# Patient Record
Sex: FEMALE | Race: BLACK OR AFRICAN AMERICAN | NOT HISPANIC OR LATINO | Employment: FULL TIME | URBAN - METROPOLITAN AREA
[De-identification: names, ages, dates, MRNs, and addresses within clinical notes are randomized per-mention and may not be internally consistent; named-entity substitution may affect disease eponyms.]

---

## 2018-08-30 ENCOUNTER — OFFICE VISIT (OUTPATIENT)
Dept: GASTROENTEROLOGY | Facility: CLINIC | Age: 38
End: 2018-08-30

## 2018-08-30 VITALS
HEIGHT: 68 IN | DIASTOLIC BLOOD PRESSURE: 80 MMHG | SYSTOLIC BLOOD PRESSURE: 126 MMHG | BODY MASS INDEX: 31.37 KG/M2 | WEIGHT: 207 LBS | TEMPERATURE: 98.8 F

## 2018-08-30 DIAGNOSIS — R19.5 LOOSE STOOLS: ICD-10-CM

## 2018-08-30 DIAGNOSIS — R10.13 DYSPEPSIA: ICD-10-CM

## 2018-08-30 DIAGNOSIS — R10.11 RIGHT UPPER QUADRANT ABDOMINAL PAIN: Primary | ICD-10-CM

## 2018-08-30 DIAGNOSIS — K44.9 HIATAL HERNIA: ICD-10-CM

## 2018-08-30 PROCEDURE — 99204 OFFICE O/P NEW MOD 45 MIN: CPT | Performed by: INTERNAL MEDICINE

## 2018-08-30 RX ORDER — PANTOPRAZOLE SODIUM 40 MG/1
40 TABLET, DELAYED RELEASE ORAL 2 TIMES DAILY
COMMUNITY
End: 2021-11-19 | Stop reason: SDUPTHER

## 2018-08-30 RX ORDER — DICYCLOMINE HYDROCHLORIDE 10 MG/1
10 CAPSULE ORAL
COMMUNITY
End: 2018-08-30 | Stop reason: ALTCHOICE

## 2018-08-30 RX ORDER — HYOSCYAMINE SULFATE 0.125 MG
0.12 TABLET ORAL EVERY 6 HOURS PRN
Qty: 90 TABLET | Refills: 1 | Status: SHIPPED | OUTPATIENT
Start: 2018-08-30 | End: 2021-11-19

## 2018-08-30 RX ORDER — LISINOPRIL AND HYDROCHLOROTHIAZIDE 20; 12.5 MG/1; MG/1
TABLET ORAL
COMMUNITY
End: 2021-11-19

## 2018-08-30 RX ORDER — METHOCARBAMOL 500 MG/1
500 TABLET, FILM COATED ORAL AS NEEDED
COMMUNITY

## 2018-08-30 RX ORDER — HYDROCODONE BITARTRATE AND ACETAMINOPHEN 7.5; 325 MG/1; MG/1
TABLET ORAL AS NEEDED
Refills: 0 | COMMUNITY
Start: 2018-08-05

## 2018-08-30 NOTE — PROGRESS NOTES
Chief Complaint   Patient presents with   • Heartburn   • Hiatal Hernia   • Abdominal Pain     tenderness and cramping   • Diarrhea     in the AM, mucous in stool       Subjective     HPI    Keerthi Graff is a 38 y.o. female with a past medical history noted below who presents for evaluation of abdominal pain and diarrhea.  Symptoms have been an issue for a number of years with acute worsening over the past year.  She describes RUQ pain. It is intermittent.  Described as tenderness and pressure like.  Associated with nausea but no vomiting.  She reports that symptoms have been attributed to a hiatal hernia  She reports EGD with Dr Joiner in about 2013.    Describes loose stools in the morning.  She averages about 2 loose BMs in the morning after drinking coffee.  She has had mucus in the stool, no blood. She thinks she averages about 2-4 BMs per day.  However, sometimes she has none. She does take bentyl 3-4 days per week.  She takes pantoprazole daily.      She takes daily ibuprofen for bulging disks. She finds pain meds cause too much constipation.     Labs from 2017 at [facility] reviewed--she had a normal BMP      Past Medical History:   Diagnosis Date   • Delayed gastric emptying    • GERD (gastroesophageal reflux disease)    • Hiatal hernia          Current Outpatient Prescriptions:   •  diclofenac (VOLTAREN) 1 % gel gel, Place 2 g on the skin as directed by provider., Disp: , Rfl:   •  HYDROcodone-acetaminophen (NORCO) 7.5-325 MG per tablet, Take  by mouth As Needed., Disp: , Rfl: 0  •  lisinopril-hydrochlorothiazide (PRINZIDE,ZESTORETIC) 20-12.5 MG per tablet, lisinopril 20 mg-hydrochlorothiazide 12.5 mg tablet  Take 1 tablet every day by oral route., Disp: , Rfl:   •  methocarbamol (ROBAXIN) 500 MG tablet, Take 500 mg by mouth As Needed., Disp: , Rfl:   •  metoprolol tartrate (LOPRESSOR) 25 MG tablet, Take 25 mg by mouth Daily., Disp: , Rfl:   •  Nitrofurantoin Macrocrystal (MACRODANTIN PO), Take   by mouth Daily., Disp: , Rfl:   •  pantoprazole (PROTONIX) 40 MG EC tablet, Take 40 mg by mouth 2 (Two) Times a Day., Disp: , Rfl:   •  hyoscyamine (ANASPAZ,LEVSIN) 0.125 MG tablet, Take 1 tablet by mouth Every 6 (Six) Hours As Needed for Cramping (abdominal pain)., Disp: 90 tablet, Rfl: 1    Allergies   Allergen Reactions   • Sulfamethoxazole-Trimethoprim Hives       Social History     Social History   • Marital status:      Spouse name: N/A   • Number of children: N/A   • Years of education: N/A     Occupational History   • Not on file.     Social History Main Topics   • Smoking status: Former Smoker   • Smokeless tobacco: Not on file   • Alcohol use Not on file   • Drug use: Unknown   • Sexual activity: Not on file     Other Topics Concern   • Not on file     Social History Narrative   • No narrative on file       Family History   Problem Relation Age of Onset   • Cirrhosis Mother        Review of Systems   Constitutional: Negative for activity change, appetite change and fatigue.   HENT: Negative for sore throat and trouble swallowing.    Respiratory: Negative.    Cardiovascular: Negative.    Gastrointestinal: Positive for abdominal pain and diarrhea. Negative for abdominal distention and blood in stool.        +GERD   Endocrine: Negative for cold intolerance and heat intolerance.   Genitourinary: Negative for difficulty urinating, dysuria and frequency.   Musculoskeletal: Negative for arthralgias, back pain and myalgias.   Skin: Negative.    Hematological: Negative for adenopathy. Does not bruise/bleed easily.   All other systems reviewed and are negative.      Objective     Vitals:    08/30/18 1001   BP: 126/80   Temp: 98.8 °F (37.1 °C)     1    08/30/18  1001   Weight: 93.9 kg (207 lb)     Body mass index is 31.47 kg/m².    Physical Exam   Constitutional: She is oriented to person, place, and time. She appears well-developed and well-nourished. No distress.   HENT:   Head: Normocephalic and  atraumatic.   Right Ear: External ear normal.   Left Ear: External ear normal.   Nose: Nose normal.   Mouth/Throat: Oropharynx is clear and moist.   Eyes: Conjunctivae and EOM are normal. Right eye exhibits no discharge. Left eye exhibits no discharge. No scleral icterus.   Neck: Normal range of motion. Neck supple. No thyromegaly present.   No supraclavicular adenopathy   Cardiovascular: Normal rate, regular rhythm, normal heart sounds and intact distal pulses.  Exam reveals no gallop.    No murmur heard.  No lower extremity edema   Pulmonary/Chest: Effort normal and breath sounds normal. No respiratory distress. She has no wheezes.   Abdominal: Soft. Normal appearance and bowel sounds are normal. She exhibits no distension and no mass. There is no hepatosplenomegaly. There is tenderness. There is no rigidity, no rebound and no guarding. No hernia.   RUQ TTP   Genitourinary:   Genitourinary Comments: Rectal exam deferred   Musculoskeletal: Normal range of motion. She exhibits no edema or tenderness.   No atrophy of upper or lower extremities.  Normal digits and nails of both hands.   Lymphadenopathy:     She has no cervical adenopathy.   Neurological: She is alert and oriented to person, place, and time. She displays no atrophy. Coordination normal.   Skin: Skin is warm and dry. No rash noted. She is not diaphoretic. No erythema.   Psychiatric: She has a normal mood and affect. Her behavior is normal. Judgment and thought content normal.   Vitals reviewed.      WBC   Date Value Ref Range Status   02/10/2015 4.27 (L) 4.50 - 10.70 K/Cumm Final     RBC   Date Value Ref Range Status   02/10/2015 5.01 3.90 - 5.20 Million Final     Hemoglobin   Date Value Ref Range Status   02/10/2015 11.1 (L) 11.9 - 15.5 g/dL Final     Hematocrit   Date Value Ref Range Status   02/10/2015 36.2 35.6 - 45.5 % Final     MCV   Date Value Ref Range Status   02/10/2015 72.3 (L) 80.5 - 98.2 fL Final     MCH   Date Value Ref Range Status    02/10/2015 22.2 (L) 26.9 - 32.0 pg Final     MCHC   Date Value Ref Range Status   02/10/2015 30.7 (L) 32.4 - 36.3 g/dL Final     RDW   Date Value Ref Range Status   02/10/2015 15.1 (H) 11.7 - 13.0 % Final     Platelets   Date Value Ref Range Status   02/10/2015 335 140 - 500 K/Cumm Final     Neutrophil Rel %   Date Value Ref Range Status   02/10/2015 68.8 42.7 - 76.0 % Final     Lymphocyte Rel %   Date Value Ref Range Status   02/10/2015 22.7 19.6 - 45.3 % Final     Monocyte Rel %   Date Value Ref Range Status   02/10/2015 7.5 5.0 - 12.0 % Final     Eosinophil Rel %   Date Value Ref Range Status   02/10/2015 0.5 0.3 - 6.2 % Final     Basophil Rel %   Date Value Ref Range Status   02/10/2015 0.5 0.0 - 1.5 % Final     Neutrophils Absolute   Date Value Ref Range Status   02/10/2015 2.9 1.9 - 8.1 K/Cumm Final     Lymphocytes Absolute   Date Value Ref Range Status   02/10/2015 1.0 0.9 - 4.8 K/Cumm Final     Monocytes Absolute   Date Value Ref Range Status   02/10/2015 0.3 0.2 - 1.2 K/Cumm Final     Eosinophils Absolute   Date Value Ref Range Status   02/10/2015 0.0 0.0 - 0.7 K/Cumm Final     Basophils Absolute   Date Value Ref Range Status   02/10/2015 0.0 0.0 - 0.2 K/Cumm Final       Glucose   Date Value Ref Range Status   02/10/2015 98 65 - 99 mg/dL Final     Sodium   Date Value Ref Range Status   02/10/2015 141 136 - 145 mmol/L Final     Potassium   Date Value Ref Range Status   02/10/2015 3.4 (L) 3.5 - 5.2 mmol/L Final     CO2   Date Value Ref Range Status   02/10/2015 33 (H) 22 - 29 mmol/L Final     Chloride   Date Value Ref Range Status   02/10/2015 102 98 - 107 mmol/L Final     Creatinine   Date Value Ref Range Status   02/10/2015 0.71 0.57 - 1.00 mg/dL Final     BUN   Date Value Ref Range Status   02/10/2015 10 6 - 20 mg/dL Final     Calcium   Date Value Ref Range Status   02/10/2015 9.4 8.6 - 10.5 mg/dL Final     Alkaline Phosphatase   Date Value Ref Range Status   02/10/2015 61 39 - 117 U/L Final     Total  Protein   Date Value Ref Range Status   02/10/2015 7.5 6.0 - 8.5 g/dL Final     ALT (SGPT)   Date Value Ref Range Status   02/10/2015 12 5 - 33 U/L Final     AST (SGOT)   Date Value Ref Range Status   02/10/2015 14 5 - 32 U/L Final     Total Bilirubin   Date Value Ref Range Status   02/10/2015 0.3 0.1 - 1.2 mg/dL Final     Albumin   Date Value Ref Range Status   02/10/2015 4.3 3.5 - 5.2 g/dL Final       RUQ US    IMPRESSION:  No acute abnormalities. Specifically, there is no sonographically-evident gallbladder or biliary pathology.        Dictated by: Cory Saavedra M.D.    Images and Report reviewed and interpreted by: Cory Saavedra M.D.    <PS><Electronically signed by: Cory Saavedra M.D.>  03/02/2016 1118    D: 03/02/2016 1117      No notes on file    Assessment/Plan    RUQ pain: chronic issue, no recent HIDA    Dyspepsia: suspect NSAID related    Diarrhea: mild, in the morning, hx of normal colonoscopy; suspect IBS    Plan  Check HIDA scan  Stop dicyclomine, start hyoscyamine  Get records from Dr. Yeboah to see if any further endoscopic workup is needed  continue pantoprazole as long as she is taking high-dose NSAIDs      Canice was seen today for heartburn, hiatal hernia, abdominal pain and diarrhea.    Diagnoses and all orders for this visit:    Right upper quadrant abdominal pain  -     NM Hepatobiliary Without CCK; Future    Loose stools    Hiatal hernia    Dyspepsia    Other orders  -     hyoscyamine (ANASPAZ,LEVSIN) 0.125 MG tablet; Take 1 tablet by mouth Every 6 (Six) Hours As Needed for Cramping (abdominal pain).        I have discussed the above plan with the patient.  They verbalize understanding and are in agreement with the plan.  They have been advised to contact the office for any questions, concerns, or changes related to their health.    Dictated utilizing Dragon dictation

## 2018-09-06 ENCOUNTER — TELEPHONE (OUTPATIENT)
Dept: GASTROENTEROLOGY | Facility: CLINIC | Age: 38
End: 2018-09-06

## 2018-09-06 DIAGNOSIS — R10.11 RIGHT UPPER QUADRANT ABDOMINAL PAIN: Primary | ICD-10-CM

## 2018-09-06 NOTE — TELEPHONE ENCOUNTER
Pt called and reports that she is severe pain in her right side.  She states her pain is a 6-7 on the pain scale. Pt denies a fever and chills.  She states her bowels are slightly loose.  Pt denies nausea.  She reports she went to the ER at [facility] but they only did lab work and send her home. Pt was to have her hida scan today but it was not approved.  Pt states she did take some motrin and one of her back pain pills(hydrocodone) and her pain has eased off some.  Advised pt would update DR Angela and in the meantime if pain worsens to go back to ER.  Also cautioned pt that the pain medication could be masking her pain and to be cautious.  Pt verb understanding.  ADvised pt that we will check with Suzi to see why the hida scan was not covered by her insurance.

## 2018-09-07 ENCOUNTER — APPOINTMENT (OUTPATIENT)
Dept: NUCLEAR MEDICINE | Facility: HOSPITAL | Age: 38
End: 2018-09-07
Attending: INTERNAL MEDICINE

## 2018-09-07 NOTE — TELEPHONE ENCOUNTER
Suzi is working on the HIDA.  Can we get her ER records-- if she didn't have a CT scan there we can order one.  Agree with to ER if not better.

## 2018-09-07 NOTE — TELEPHONE ENCOUNTER
Called [facility] at [phone] on vm requested the pt's ER records be faxed to [phone].

## 2018-09-07 NOTE — TELEPHONE ENCOUNTER
ER Records received and scanned under media tab.  No ct scan ordered while in ER.  Ct scan of abd/pelvis ordered.  Called pt and advised that we have ordered a ct scan and are waiting to hear back from her insurance regarding the hida scan. Pt verb understanding and update sent to Dr Angela.

## 2018-09-10 ENCOUNTER — TELEPHONE (OUTPATIENT)
Dept: GASTROENTEROLOGY | Facility: CLINIC | Age: 38
End: 2018-09-10

## 2018-09-10 DIAGNOSIS — R10.11 RUQ PAIN: Primary | ICD-10-CM

## 2018-09-10 NOTE — TELEPHONE ENCOUNTER
----- Message from Suzi Means sent at 9/10/2018  1:17 PM EDT -----  Regarding: CT scan  I was able to get pt CT scan approved but only with contrast. Is this ok? They still won't approve HIDA without the U/S. If this is ok, please put in a new order. Thanks

## 2018-09-12 ENCOUNTER — HOSPITAL ENCOUNTER (OUTPATIENT)
Dept: CT IMAGING | Facility: HOSPITAL | Age: 38
Discharge: HOME | End: 2018-09-12
Attending: INTERNAL MEDICINE | Admitting: INTERNAL MEDICINE

## 2018-09-12 ENCOUNTER — APPOINTMENT (OUTPATIENT)
Dept: CT IMAGING | Facility: HOSPITAL | Age: 38
End: 2018-09-12
Attending: INTERNAL MEDICINE

## 2018-09-12 DIAGNOSIS — R10.11 RUQ PAIN: ICD-10-CM

## 2018-09-12 LAB — CREAT BLDA-MCNC: 0.8 MG/DL (ref 0.6–1.3)

## 2018-09-12 PROCEDURE — 82565 ASSAY OF CREATININE: CPT

## 2018-09-12 PROCEDURE — 0 DIATRIZOATE MEGLUMINE & SODIUM PER 1 ML: Performed by: INTERNAL MEDICINE

## 2018-09-12 PROCEDURE — 25010000002 IOPAMIDOL 61 % SOLUTION: Performed by: INTERNAL MEDICINE

## 2018-09-12 PROCEDURE — 74177 CT ABD & PELVIS W/CONTRAST: CPT

## 2018-09-12 RX ADMIN — IOPAMIDOL 85 ML: 612 INJECTION, SOLUTION INTRAVENOUS at 09:48

## 2018-09-12 RX ADMIN — DIATRIZOATE MEGLUMINE AND DIATRIZOATE SODIUM 30 ML: 660; 100 LIQUID ORAL; RECTAL at 08:30

## 2018-09-14 ENCOUNTER — TELEPHONE (OUTPATIENT)
Dept: GASTROENTEROLOGY | Facility: CLINIC | Age: 38
End: 2018-09-14

## 2018-09-14 NOTE — TELEPHONE ENCOUNTER
PT CALLED AGAIN REGARDING CT SCAN RESULTS   Received: Today      Call patient   Message Contents   Tessa Wilson [address]   Phone Number: [phone]             SHE IS A PT OF DR FONG. PT WAS HOPING TO GET RESULTS TODAY     Message to DR Fong.

## 2018-09-14 NOTE — TELEPHONE ENCOUNTER
----- Message from Tessa Wilson sent at 9/14/2018  4:11 PM EDT -----  Regarding: PT CALLED AGAIN REGARDING CT SCAN RESULTS  Contact: [phone]  SHE IS A PT OF DR FONG. PT WAS HOPING TO GET RESULTS TODAY.

## 2018-09-14 NOTE — TELEPHONE ENCOUNTER
----- Message from Suzi Means sent at 9/14/2018  1:48 PM EDT -----  Regarding: results  Contact: [phone]  Pt would like results from CT scan. Rivera

## 2018-09-14 NOTE — TELEPHONE ENCOUNTER
I called and discussed her results ET with her-Randy normal except for a left ovarian cyst.    She says she's been taking hyoscyamine which has helped but she is still having the episodes of right upper quadrant pain.    Suzi, can you look to see if we are okay to proceed with a HIDA scan now that she has had a CT scan, thanks

## 2018-09-17 ENCOUNTER — TELEPHONE (OUTPATIENT)
Dept: GASTROENTEROLOGY | Facility: CLINIC | Age: 38
End: 2018-09-17

## 2018-09-17 NOTE — TELEPHONE ENCOUNTER
----- Message from Suzi Means sent at 9/17/2018  1:20 PM EDT -----  Regarding: peer to peer  Contact: [phone]  I called [facility] to get the case withdrawn so I can try to get it approved. They will not allow me to withdraw it and said that if we want an approval for this scan we need to have the dr call to do a peer to peer only.    Case#- [id number]  Phone number- [phone] option #3    Thanks

## 2019-04-04 PROCEDURE — 99284 EMERGENCY DEPT VISIT MOD MDM: CPT

## 2019-04-05 ENCOUNTER — HOSPITAL ENCOUNTER (EMERGENCY)
Facility: HOSPITAL | Age: 39
Discharge: HOME | End: 2019-04-05
Attending: EMERGENCY MEDICINE | Admitting: EMERGENCY MEDICINE

## 2019-04-05 ENCOUNTER — APPOINTMENT (OUTPATIENT)
Dept: CT IMAGING | Facility: HOSPITAL | Age: 39
End: 2019-04-05

## 2019-04-05 ENCOUNTER — TELEPHONE (OUTPATIENT)
Dept: GASTROENTEROLOGY | Facility: CLINIC | Age: 39
End: 2019-04-05

## 2019-04-05 VITALS
HEART RATE: 79 BPM | WEIGHT: 223 LBS | OXYGEN SATURATION: 100 % | RESPIRATION RATE: 16 BRPM | HEIGHT: 68 IN | DIASTOLIC BLOOD PRESSURE: 61 MMHG | SYSTOLIC BLOOD PRESSURE: 106 MMHG | TEMPERATURE: 98.8 F | BODY MASS INDEX: 33.8 KG/M2

## 2019-04-05 DIAGNOSIS — R10.84 GENERALIZED ABDOMINAL PAIN: Primary | ICD-10-CM

## 2019-04-05 DIAGNOSIS — R30.0 DYSURIA: ICD-10-CM

## 2019-04-05 LAB
ALBUMIN SERPL-MCNC: 3.7 G/DL (ref 3.5–5.2)
ALBUMIN/GLOB SERPL: 1.3 G/DL
ALP SERPL-CCNC: 47 U/L (ref 39–117)
ALT SERPL W P-5'-P-CCNC: 13 U/L (ref 1–33)
ANION GAP SERPL CALCULATED.3IONS-SCNC: 9.9 MMOL/L
AST SERPL-CCNC: 12 U/L (ref 1–32)
BASOPHILS # BLD AUTO: 0.03 10*3/MM3 (ref 0–0.2)
BASOPHILS NFR BLD AUTO: 0.6 % (ref 0–1.5)
BILIRUB SERPL-MCNC: <0.2 MG/DL (ref 0.2–1.2)
BILIRUB UR QL STRIP: NEGATIVE
BUN BLD-MCNC: 15 MG/DL (ref 6–20)
BUN/CREAT SERPL: 16.7 (ref 7–25)
CALCIUM SPEC-SCNC: 9.4 MG/DL (ref 8.6–10.5)
CHLORIDE SERPL-SCNC: 100 MMOL/L (ref 98–107)
CLARITY UR: CLEAR
CO2 SERPL-SCNC: 28.1 MMOL/L (ref 22–29)
COLOR UR: YELLOW
CREAT BLD-MCNC: 0.9 MG/DL (ref 0.57–1)
DEPRECATED RDW RBC AUTO: 41.8 FL (ref 37–54)
EOSINOPHIL # BLD AUTO: 0.11 10*3/MM3 (ref 0–0.4)
EOSINOPHIL NFR BLD AUTO: 2.3 % (ref 0.3–6.2)
ERYTHROCYTE [DISTWIDTH] IN BLOOD BY AUTOMATED COUNT: 15.2 % (ref 12.3–15.4)
GFR SERPL CREATININE-BSD FRML MDRD: 84 ML/MIN/1.73
GLOBULIN UR ELPH-MCNC: 2.9 GM/DL
GLUCOSE BLD-MCNC: 92 MG/DL (ref 65–99)
GLUCOSE UR STRIP-MCNC: NEGATIVE MG/DL
HCT VFR BLD AUTO: 33.6 % (ref 34–46.6)
HGB BLD-MCNC: 10 G/DL (ref 12–15.9)
HGB UR QL STRIP.AUTO: NEGATIVE
HOLD SPECIMEN: NORMAL
HOLD SPECIMEN: NORMAL
IMM GRANULOCYTES # BLD AUTO: 0.01 10*3/MM3 (ref 0–0.05)
IMM GRANULOCYTES NFR BLD AUTO: 0.2 % (ref 0–0.5)
KETONES UR QL STRIP: ABNORMAL
LEUKOCYTE ESTERASE UR QL STRIP.AUTO: NEGATIVE
LIPASE SERPL-CCNC: 18 U/L (ref 13–60)
LYMPHOCYTES # BLD AUTO: 1.98 10*3/MM3 (ref 0.7–3.1)
LYMPHOCYTES NFR BLD AUTO: 41.2 % (ref 19.6–45.3)
MCH RBC QN AUTO: 22.6 PG (ref 26.6–33)
MCHC RBC AUTO-ENTMCNC: 29.8 G/DL (ref 31.5–35.7)
MCV RBC AUTO: 75.8 FL (ref 79–97)
MONOCYTES # BLD AUTO: 0.41 10*3/MM3 (ref 0.1–0.9)
MONOCYTES NFR BLD AUTO: 8.5 % (ref 5–12)
NEUTROPHILS # BLD AUTO: 2.27 10*3/MM3 (ref 1.4–7)
NEUTROPHILS NFR BLD AUTO: 47.2 % (ref 42.7–76)
NITRITE UR QL STRIP: NEGATIVE
NRBC BLD AUTO-RTO: 0 /100 WBC (ref 0–0)
PH UR STRIP.AUTO: <=5 [PH] (ref 5–8)
PLATELET # BLD AUTO: 296 10*3/MM3 (ref 140–450)
PMV BLD AUTO: 10.9 FL (ref 6–12)
POTASSIUM BLD-SCNC: 3.3 MMOL/L (ref 3.5–5.2)
PROT SERPL-MCNC: 6.6 G/DL (ref 6–8.5)
PROT UR QL STRIP: NEGATIVE
RBC # BLD AUTO: 4.43 10*6/MM3 (ref 3.77–5.28)
SODIUM BLD-SCNC: 138 MMOL/L (ref 136–145)
SP GR UR STRIP: >=1.03 (ref 1–1.03)
UROBILINOGEN UR QL STRIP: ABNORMAL
WBC NRBC COR # BLD: 4.81 10*3/MM3 (ref 3.4–10.8)
WHOLE BLOOD HOLD SPECIMEN: NORMAL
WHOLE BLOOD HOLD SPECIMEN: NORMAL

## 2019-04-05 PROCEDURE — 96375 TX/PRO/DX INJ NEW DRUG ADDON: CPT

## 2019-04-05 PROCEDURE — 96374 THER/PROPH/DIAG INJ IV PUSH: CPT

## 2019-04-05 PROCEDURE — 25010000002 ONDANSETRON PER 1 MG: Performed by: PHYSICIAN ASSISTANT

## 2019-04-05 PROCEDURE — 81003 URINALYSIS AUTO W/O SCOPE: CPT | Performed by: PHYSICIAN ASSISTANT

## 2019-04-05 PROCEDURE — 74176 CT ABD & PELVIS W/O CONTRAST: CPT

## 2019-04-05 PROCEDURE — 80053 COMPREHEN METABOLIC PANEL: CPT | Performed by: PHYSICIAN ASSISTANT

## 2019-04-05 PROCEDURE — 85025 COMPLETE CBC W/AUTO DIFF WBC: CPT | Performed by: PHYSICIAN ASSISTANT

## 2019-04-05 PROCEDURE — 25010000002 HYDROMORPHONE 1 MG/ML SOLUTION: Performed by: EMERGENCY MEDICINE

## 2019-04-05 PROCEDURE — 83690 ASSAY OF LIPASE: CPT | Performed by: PHYSICIAN ASSISTANT

## 2019-04-05 RX ORDER — ONDANSETRON 2 MG/ML
4 INJECTION INTRAMUSCULAR; INTRAVENOUS ONCE
Status: DISCONTINUED | OUTPATIENT
Start: 2019-04-05 | End: 2019-04-05

## 2019-04-05 RX ORDER — ONDANSETRON 2 MG/ML
4 INJECTION INTRAMUSCULAR; INTRAVENOUS ONCE
Status: COMPLETED | OUTPATIENT
Start: 2019-04-05 | End: 2019-04-05

## 2019-04-05 RX ORDER — FLAVOXATE HYDROCHLORIDE 100 MG/1
100 TABLET ORAL 3 TIMES DAILY PRN
Qty: 12 TABLET | Refills: 0 | Status: SHIPPED | OUTPATIENT
Start: 2019-04-05 | End: 2021-11-19

## 2019-04-05 RX ORDER — TRAMADOL HYDROCHLORIDE 50 MG/1
50 TABLET ORAL EVERY 6 HOURS PRN
Qty: 15 TABLET | Refills: 0 | Status: SHIPPED | OUTPATIENT
Start: 2019-04-05 | End: 2020-07-29 | Stop reason: SDDI

## 2019-04-05 RX ADMIN — SODIUM CHLORIDE 500 ML: 9 INJECTION, SOLUTION INTRAVENOUS at 03:03

## 2019-04-05 RX ADMIN — ONDANSETRON HYDROCHLORIDE 4 MG: 2 SOLUTION INTRAMUSCULAR; INTRAVENOUS at 03:06

## 2019-04-05 RX ADMIN — HYDROMORPHONE HYDROCHLORIDE 1 MG: 1 INJECTION, SOLUTION INTRAMUSCULAR; INTRAVENOUS; SUBCUTANEOUS at 03:08

## 2019-04-05 NOTE — TELEPHONE ENCOUNTER
Call to pt.  States understood that f/u 8/2018 would include CT and HIDA.  Insurance denied HIDA - pt thought this was in appeal.      However, stopped taking Ibuprofen and stomach issues resolved.  Went to ER last night 2nd resumption of abd pain (see notes).  Was advised to f/u with GI and to have HIDA.       Asking if may proceed with HIDA, or if needs o/v first.     Message to Dr Angela.

## 2019-04-05 NOTE — TELEPHONE ENCOUNTER
----- Message from Kerwin Bray sent at 4/5/2019 10:07 AM EDT -----  Regarding: pt called about a hida scan ?  Contact: [phone]  ..

## 2019-04-05 NOTE — ED PROVIDER NOTES
" EMERGENCY DEPARTMENT ENCOUNTER    CHIEF COMPLAINT  Chief Complaint: abd pain  History given by: patient   History limited by: nothing  Room Number: 28/28  PMD: Kojo Hughes PA      HPI:  Pt is a 38 y.o. female who presents to the ED complaining of intermittent abd pain that has been going on for a few days. Pt reports the pain radiates to her back. Pt reports \"I just don't feel right, I'm sick to my stomach\". Pt admits nausea, headache, constipation, and dysuria. Pt reports she takes Ibuprofen for pain relief but she stopped taking it and her stomach felt better. Pt [place] [facility] because she assumed she had a UTI. Pt states she recently finished a Z-pack. Pt reports a hx of UTI's. Pt had a hysterectomy in the past. There are no other complaints at this time.      Duration: days  Onset: gradual  Timing: intermittent   Location: abd   Radiation: back  Quality: \"I just don't feel right, I'm sick to my stomach\"  Intensity/Severity: moderate  Progression: unchanged   Associated Symptoms: pt admits nausea, headache, constipation, and dysuria  Aggravating Factors: none mentioned   Alleviating Factors: pt reports she takes Ibuprofen for pain relief but she stopped taking it and her stomach felt better  Previous Episodes: pt reports a hx of UTI's  Treatment before arrival: pt reports she [place] [facility] [place] because she assumed she had a UTI    PAST MEDICAL HISTORY  Active Ambulatory Problems     Diagnosis Date Noted   • Right upper quadrant abdominal pain 08/30/2018   • Loose stools 08/30/2018   • Hiatal hernia 08/30/2018   • Dyspepsia 08/30/2018     Resolved Ambulatory Problems     Diagnosis Date Noted   • No Resolved Ambulatory Problems     Past Medical History:   Diagnosis Date   • Delayed gastric emptying    • GERD (gastroesophageal reflux disease)    • Hiatal hernia        PAST SURGICAL HISTORY  Past Surgical History:   Procedure Laterality Date   • BILATERAL " "BREAST REDUCTION     •  SECTION      x2   • COLONOSCOPY  2013    normal   • CYST REMOVAL Right     x2   • DILATATION AND CURETTAGE     • ENDOMETRIAL ABLATION     • GANGLION CYST EXCISION Right    • HYSTERECTOMY     • UPPER GASTROINTESTINAL ENDOSCOPY  2013    gerd       FAMILY HISTORY  Family History   Problem Relation Age of Onset   • Cirrhosis Mother        SOCIAL HISTORY  Social History     Socioeconomic History   • Marital status:      Spouse name: Not on file   • Number of children: Not on file   • Years of education: Not on file   • Highest education level: Not on file   Tobacco Use   • Smoking status: Former Smoker   • Smokeless tobacco: Never Used   Substance and Sexual Activity   • Alcohol use: Yes     Comment: \"socially\" \"once a month\"   • Drug use: Yes     Frequency: 4.0 times per week     Types: Marijuana   • Sexual activity: Defer       ALLERGIES  Ibuprofen and Sulfamethoxazole-trimethoprim    REVIEW OF SYSTEMS  Review of Systems   Constitutional: Negative for fever.   HENT: Negative for sore throat.    Eyes: Negative.    Respiratory: Negative for cough and shortness of breath.    Cardiovascular: Negative for chest pain.   Gastrointestinal: Positive for abdominal pain (intermittent), constipation and nausea. Negative for diarrhea and vomiting.   Genitourinary: Positive for dysuria.   Musculoskeletal: Negative for neck pain.   Skin: Negative for rash.   Neurological: Positive for headaches. Negative for weakness and numbness.   Hematological: Negative.    Psychiatric/Behavioral: Negative.    All other systems reviewed and are negative.      PHYSICAL EXAM  ED Triage Vitals [19 2351]   Temp Heart Rate Resp BP SpO2   98.8 °F (37.1 °C) 103 16 -- 99 %      Temp src Heart Rate Source Patient Position BP Location FiO2 (%)   -- -- -- -- --       Physical Exam   Constitutional: She is oriented to person, place, and time. No distress.   HENT:   Head: Normocephalic and atraumatic.   Eyes: EOM " are normal. Pupils are equal, round, and reactive to light.   Neck: Normal range of motion. Neck supple.   Cardiovascular: Normal rate, regular rhythm and normal heart sounds.   Pulmonary/Chest: Effort normal and breath sounds normal. No respiratory distress.   Abdominal: Soft. Bowel sounds are normal. There is tenderness (R side). There is no rebound, no guarding and no CVA tenderness.   Musculoskeletal: Normal range of motion. She exhibits no edema.   Neurological: She is alert and oriented to person, place, and time. She has normal sensation and normal strength.   Skin: Skin is warm and dry. No rash noted.   Psychiatric: Mood and affect normal.   Nursing note and vitals reviewed.      LAB RESULTS  Lab Results (last 24 hours)     Procedure Component Value Units Date/Time    Urinalysis With Microscopic If Indicated (No Culture) - Urine, Clean Catch [[id number]]  (Abnormal) Collected:  04/05/19 0151    Specimen:  Urine, Clean Catch Updated:  04/05/19 0205     Color, UA Yellow     Appearance, UA Clear     pH, UA <=5.0     Specific Gravity, UA >=1.030     Glucose, UA Negative     Ketones, UA Trace     Bilirubin, UA Negative     Blood, UA Negative     Protein, UA Negative     Leuk Esterase, UA Negative     Nitrite, UA Negative     Urobilinogen, UA 0.2 E.U./dL    Narrative:       Urine microscopic not indicated.    CBC & Differential [[id number]] Collected:  04/05/19 0202    Specimen:  Blood Updated:  04/05/19 0215    Narrative:       The following orders were created for panel order CBC & Differential.  Procedure                               Abnormality         Status                     ---------                               -----------         ------                     CBC Auto Differential[[id number]]        Abnormal            Final result                 Please view results for these tests on the individual orders.    Comprehensive Metabolic Panel [[id number]]  (Abnormal) Collected:  04/05/19 0202    Specimen:   Blood Updated:  04/05/19 0238     Glucose 92 mg/dL      BUN 15 mg/dL      Creatinine 0.90 mg/dL      Sodium 138 mmol/L      Potassium 3.3 mmol/L      Chloride 100 mmol/L      CO2 28.1 mmol/L      Calcium 9.4 mg/dL      Total Protein 6.6 g/dL      Albumin 3.70 g/dL      ALT (SGPT) 13 U/L      AST (SGOT) 12 U/L      Alkaline Phosphatase 47 U/L      Total Bilirubin <0.2 mg/dL      eGFR  African Amer 84 mL/min/1.73      Globulin 2.9 gm/dL      A/G Ratio 1.3 g/dL      BUN/Creatinine Ratio 16.7     Anion Gap 9.9 mmol/L     Narrative:       GFR Normal >60  Chronic Kidney Disease <60  Kidney Failure <15    Lipase [[id number]]  (Normal) Collected:  04/05/19 0202    Specimen:  Blood Updated:  04/05/19 0233     Lipase 18 U/L     CBC Auto Differential [[id number]]  (Abnormal) Collected:  04/05/19 0202    Specimen:  Blood Updated:  04/05/19 0215     WBC 4.81 10*3/mm3      RBC 4.43 10*6/mm3      Hemoglobin 10.0 g/dL      Hematocrit 33.6 %      MCV 75.8 fL      MCH 22.6 pg      MCHC 29.8 g/dL      RDW 15.2 %      RDW-SD 41.8 fl      MPV 10.9 fL      Platelets 296 10*3/mm3      Neutrophil % 47.2 %      Lymphocyte % 41.2 %      Monocyte % 8.5 %      Eosinophil % 2.3 %      Basophil % 0.6 %      Immature Grans % 0.2 %      Neutrophils, Absolute 2.27 10*3/mm3      Lymphocytes, Absolute 1.98 10*3/mm3      Monocytes, Absolute 0.41 10*3/mm3      Eosinophils, Absolute 0.11 10*3/mm3      Basophils, Absolute 0.03 10*3/mm3      Immature Grans, Absolute 0.01 10*3/mm3      nRBC 0.0 /100 WBC           I ordered the above labs and reviewed the results    RADIOLOGY  CT Abdomen Pelvis Without Contrast   Final Result   No acute intra-abdominal or intrapelvic process seen.       Radiation dose reduction techniques were utilized, including automated   exposure control and exposure modulation based on body size.       This report was finalized on 4/5/2019 2:26 AM by Dr. Toña Parekh M.D.               I ordered the above noted radiological  studies. Interpreted by radiologist. Reviewed by me in PACS.       PROCEDURES  Procedures      PROGRESS AND CONSULTS       0027  Ordered urinalysis for further evaluation.     0122  Ordered CT abd/pelvis for further evaluation.     0230  Discussed pt case w Dr. Staples who agrees w the plan of care after his own bedside evaluation of the patient.    0311  Ordered Zofran for nausea.    0424  Rechecked pt. Pt is resting comfortably. Notified pt of lab results and imaging which revealed nothing acute. Advised pt to follow up w PCP as needed and RTER if any new/worsening symptoms develop. Discussed the plan to discharge. Pt understands and agrees with the plan, all questions answered.      MEDICAL DECISION MAKING  Results were reviewed/discussed with the patient and they were also made aware of online access. Pt also made aware that some labs, such as cultures, will not be resulted during ER visit and follow up with PMD is necessary.     MDM  Number of Diagnoses or Management Options  Dysuria:   Generalized abdominal pain:      Amount and/or Complexity of Data Reviewed  Clinical lab tests: ordered and reviewed (HGB= 10.0)  Tests in the radiology section of CPT®: ordered and reviewed (CT abd/pelvis= no acute intracranial abdominal or intrapelvic process seen.)  Decide to obtain previous medical records or to obtain history from someone other than the patient: yes  Review and summarize past medical records: yes (Pt's previous medical records show was seen at [facility] [place] on 4/2/2019 and dx w UTI symptoms and acute diarrhea.)  Discuss the patient with other providers: yes (Dr. Staples)           DIAGNOSIS  Final diagnoses:   Generalized abdominal pain   Dysuria       DISPOSITION  DISCHARGE    Patient discharged in stable condition.    Reviewed implications of results, diagnosis, meds, responsibility to follow up, warning signs and symptoms of possible worsening, potential complications and reasons to return to ER.    Patient/Family  voiced understanding of above instructions.    Discussed plan for discharge, as there is no emergent indication for admission. Patient referred to primary care provider for BP management due to today's BP. Pt/family is agreeable and understands need for follow up and repeat testing.  Pt is aware that discharge does not mean that nothing is wrong but it indicates no emergency is present that requires admission and they must continue care with follow-up as given below or physician of their choice.     FOLLOW-UP  Munira Angela MD  [address] [address]  [phone]    Schedule an appointment as soon as possible for a visit   For further evaluation and treatment    Your Urologist    Schedule an appointment as soon as possible for a visit   For further evaluation and treatment         Medication List      New Prescriptions    flavoxATE 100 MG tablet  Commonly known as:  URISPAS  Take 1 tablet by mouth 3 (Three) Times a Day As Needed for bladder spasms.     traMADol 50 MG tablet  Commonly known as:  ULTRAM  Take 1 tablet by mouth Every 6 (Six) Hours As Needed for Moderate Pain .              Latest Documented Vital Signs:  As of 4:39 AM  BP- 119/77 HR- 84 Temp- 98.8 °F (37.1 °C) O2 sat- 98%    --  Documentation assistance provided by agusto Chavez for Markos Price PA-C. Information recorded by the scribe was done at my direction and has been verified and validated by me.     Sheryl Chavez  04/05/19 1649       Geo Price III, PA  04/05/19 4380

## 2019-04-05 NOTE — ED PROVIDER NOTES
Pt presents to ED c/o intermittent diffuse abd pain that began several months ago. Pt also c/o nausea, HA, dysuria, and fatigue. Hx of recurring UTI's.    Dr. Angela is pt GI doctor.    Upon exam, pt A&O x3, in NAD, she has moderate suprapubic tenderness, mild epigastric tenderness, no mass or rebound, and extremities and neurological findings are unremarkable.    Discussed with pt plan to discharge Ultram and a bladder antispasmotic. She should f/u with GI and urology. Pt understands and agrees with the plan, all questions answered.    MD ATTESTATION NOTE    The CRYSTAL and I have discussed this patient's history, physical exam, and treatment plan.  I have reviewed the documentation and personally had a face to face interaction with the patient. I affirm the documentation and agree with the treatment and plan.  The attached note describes my personal findings.    Documentation assistance provided by agusto Perry for Dr. Staples.  Information recorded by the scribe was done at my direction and has been verified and validated by me.     Misty Perry  04/05/19 0329       Isidro Staples MD  04/05/19 0023

## 2019-04-05 NOTE — TELEPHONE ENCOUNTER
Needs to be seen in office as it has been >6 mos, can we follow up with Suzi about the HIDA, looks like we did the peer to peer and then forwarded to Suzi

## 2019-04-05 NOTE — TELEPHONE ENCOUNTER
Call to pt.  Advise per Dr Angela that needs to be seen in office as has been >6 mo's.  Will also f/u re: hida appeal.    Verb understanding.  Appt scheduled with Dr Angela for 4/8 @ 10 am.      Message to Yolanda FUCHS re: add on.     Message to Suzi OSUNA re: appeal from 9/17/18.

## 2019-04-05 NOTE — ED NOTES
Pt stuck twice by this RN. IV obtained, but could not obtain blood sample. Hope, RN states will attempt to collect blood.     Brandy Cruz, RN  04/05/19 0219

## 2019-04-08 ENCOUNTER — TELEPHONE (OUTPATIENT)
Dept: GASTROENTEROLOGY | Facility: CLINIC | Age: 39
End: 2019-04-08

## 2019-04-08 ENCOUNTER — LAB (OUTPATIENT)
Dept: LAB | Facility: HOSPITAL | Age: 39
End: 2019-04-08

## 2019-04-08 ENCOUNTER — OFFICE VISIT (OUTPATIENT)
Dept: GASTROENTEROLOGY | Facility: CLINIC | Age: 39
End: 2019-04-08

## 2019-04-08 VITALS
SYSTOLIC BLOOD PRESSURE: 130 MMHG | BODY MASS INDEX: 33.19 KG/M2 | DIASTOLIC BLOOD PRESSURE: 74 MMHG | TEMPERATURE: 98 F | HEIGHT: 68 IN | WEIGHT: 219 LBS

## 2019-04-08 DIAGNOSIS — R10.10 PAIN OF UPPER ABDOMEN: Primary | ICD-10-CM

## 2019-04-08 DIAGNOSIS — D50.9 MICROCYTIC ANEMIA: ICD-10-CM

## 2019-04-08 DIAGNOSIS — R10.11 RIGHT UPPER QUADRANT ABDOMINAL PAIN: ICD-10-CM

## 2019-04-08 DIAGNOSIS — R10.11 RUQ PAIN: Primary | ICD-10-CM

## 2019-04-08 LAB
FERRITIN SERPL-MCNC: 65.7 NG/ML (ref 13–150)
IRON 24H UR-MRATE: 98 MCG/DL (ref 37–145)
IRON SATN MFR SERPL: 34 % (ref 20–50)
TIBC SERPL-MCNC: 289 MCG/DL (ref 298–536)
TRANSFERRIN SERPL-MCNC: 194 MG/DL (ref 200–360)

## 2019-04-08 PROCEDURE — 82728 ASSAY OF FERRITIN: CPT

## 2019-04-08 PROCEDURE — 99214 OFFICE O/P EST MOD 30 MIN: CPT | Performed by: INTERNAL MEDICINE

## 2019-04-08 PROCEDURE — 83540 ASSAY OF IRON: CPT

## 2019-04-08 PROCEDURE — 84466 ASSAY OF TRANSFERRIN: CPT

## 2019-04-08 PROCEDURE — 36415 COLL VENOUS BLD VENIPUNCTURE: CPT

## 2019-04-08 NOTE — PATIENT INSTRUCTIONS
Schedule the HIDA    Low fat diet    For any additional questions, concerns or changes to your condition after today's office visit please contact the office at [phone].

## 2019-04-08 NOTE — TELEPHONE ENCOUNTER
----- Message from Munira Angela MD sent at 4/8/2019 11:12 AM EDT -----  Can we please let her know that I reviewed all of her ER records, she has a chronic anemia going back for at least the past 5-6 years.  I would like to get iron studies, can she come back in in her convenience for these labs, thanks

## 2019-04-08 NOTE — TELEPHONE ENCOUNTER
Called pt and advised per Dr Angela that she reviewed all of her ER records , she has chronic anemia going back for at least the past 5-6 yrs.  She would like her to get iron studies .      Pt verb understanding and will come in today at 2 to have these done.  Also pt is asking if she can intermittent FMLA for abd problems.  Advised would send message to Dr Angela.

## 2019-04-08 NOTE — TELEPHONE ENCOUNTER
Per Suzi, case has been discussed with Dr Angela.  US has been ordered.  Pending those results, may potentially proceed with HIDA.

## 2019-04-08 NOTE — TELEPHONE ENCOUNTER
Called pt and advised she can bring the paperwork in and we can fill out only for the days she has been in the office and has testing.  Pt verb understanding and will fax paperwork to [phone].

## 2019-04-08 NOTE — TELEPHONE ENCOUNTER
Called pt and advised per Dr Angela that we can not give her fmla but can give her notes for office visits and test.     Pt verb understanding but states there has been a miscommunication. She states her job (at [employer]) will not accept doctor notes.  She states if she has 4 occurrences she will lose her job.  She states with all the upcoming testing she will need time off and each time off is an occurrence. 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  She states she will pay the fee.   Advised pt will resend to Dr Angela. Pt verb understanding.

## 2019-04-08 NOTE — TELEPHONE ENCOUNTER
She can bring the paperwork in, we can fill out only for days she has been in the office and has testing.

## 2019-04-08 NOTE — PROGRESS NOTES
Chief Complaint   Patient presents with   • Abdominal Pain   • Nausea     Subjective     HPI  Keerthi Graff is a 38 y.o. female who presents for follow-up of abdominal pain, diarrhea, and dyspepsia.  She was first seen back in August.  We were unable to get a HIDA scan, and went to peer to peer request and then apparently never was resubmitted to her insurance.  She had done better following stopping NSAIDs -- she had been taking multiple doses of ibuprofen for neck issues.    She reports her pain has returned and worsened over the past week. Started with eating palmer 6 days ago.  Developed upper abdominal pain, pressure.  Thought she had a UTI and went to urgent care, was given cipro.  Didn't really help her.  Pain radiating to RUQ and R shoulder.  Pain worse with eating but still has an appetite.  I gave her hyoscyamine in August but she says it hasn't helped her much.  Zofran did not help either.  She does take daily pantoprazole.  She was seen in the ER on the fifth, CT exam did not show any right upper quadrant pathology.    Of note, her hemoglobin was 10 with microcytic indices.  She is status post hysterectomy.    BMs which are usually loose, mucousy, and frequent have slowed down to a little bit of constipation.  Review of her past notes, including an office visit with Dr. Murillo in 2016 suggest issues with alternating diarrhea and constipation.    No change in her weight.  Doing well with avoiding NSAIDs.      Overall just not feeling well with her symptoms.          Past Medical History:   Diagnosis Date   • Delayed gastric emptying    • GERD (gastroesophageal reflux disease)    • Hiatal hernia        Social History     Socioeconomic History   • Marital status:      Spouse name: Not on file   • Number of children: Not on file   • Years of education: Not on file   • Highest education level: Not on file   Tobacco Use   • Smoking status: Former Smoker   • Smokeless tobacco: Never Used   Substance  "and Sexual Activity   • Alcohol use: Yes     Comment: \"socially\" \"once a month\"   • Drug use: Yes     Frequency: 4.0 times per week     Types: Marijuana   • Sexual activity: Defer         Current Outpatient Medications:   •  Cholecalciferol (VITAMIN D) 1000 units tablet, Vitamin D, Disp: , Rfl:   •  diclofenac (VOLTAREN) 1 % gel gel, Place 2 g on the skin as directed by provider., Disp: , Rfl:   •  HYDROcodone-acetaminophen (NORCO) 7.5-325 MG per tablet, Take  by mouth As Needed., Disp: , Rfl: 0  •  hyoscyamine (ANASPAZ,LEVSIN) 0.125 MG tablet, Take 1 tablet by mouth Every 6 (Six) Hours As Needed for Cramping (abdominal pain)., Disp: 90 tablet, Rfl: 1  •  lisinopril-hydrochlorothiazide (PRINZIDE,ZESTORETIC) 20-12.5 MG per tablet, lisinopril 20 mg-hydrochlorothiazide 12.5 mg tablet  Take 1 tablet every day by oral route., Disp: , Rfl:   •  methocarbamol (ROBAXIN) 500 MG tablet, Take 500 mg by mouth As Needed., Disp: , Rfl:   •  metoprolol tartrate (LOPRESSOR) 25 MG tablet, Take 25 mg by mouth Daily., Disp: , Rfl:   •  Nitrofurantoin Macrocrystal (MACRODANTIN PO), Take  by mouth Daily., Disp: , Rfl:   •  pantoprazole (PROTONIX) 40 MG EC tablet, Take 40 mg by mouth 2 (Two) Times a Day., Disp: , Rfl:   •  flavoxATE (URISPAS) 100 MG tablet, Take 1 tablet by mouth 3 (Three) Times a Day As Needed for bladder spasms., Disp: 12 tablet, Rfl: 0  •  traMADol (ULTRAM) 50 MG tablet, Take 1 tablet by mouth Every 6 (Six) Hours As Needed for Moderate Pain ., Disp: 15 tablet, Rfl: 0    Review of Systems   Constitutional: Negative for activity change, appetite change, chills and fever.   HENT: Negative for trouble swallowing.    Respiratory: Negative.    Cardiovascular: Negative.  Negative for chest pain.   Gastrointestinal: Positive for abdominal pain, constipation and diarrhea. Negative for abdominal distention, anal bleeding, nausea and vomiting.   Genitourinary: Negative for dysuria, frequency and hematuria.       Objective "   Vitals:    04/08/19 1015   BP: 130/74   Temp: 98 °F (36.7 °C)         04/08/19  1015   Weight: 99.3 kg (219 lb)     Body mass index is 33.3 kg/m².      Physical Exam   Constitutional: She is oriented to person, place, and time. She appears well-developed and well-nourished. No distress.   HENT:   Head: Normocephalic and atraumatic.   Right Ear: External ear normal.   Left Ear: External ear normal.   Nose: Nose normal.   Mouth/Throat: Oropharynx is clear and moist.   Eyes: Conjunctivae and EOM are normal. Right eye exhibits no discharge. Left eye exhibits no discharge. No scleral icterus.   Neck: Normal range of motion. Neck supple. No thyromegaly present.   No supraclavicular adenopathy   Cardiovascular: Normal rate, regular rhythm, normal heart sounds and intact distal pulses. Exam reveals no gallop.   No murmur heard.  No lower extremity edema   Pulmonary/Chest: Effort normal and breath sounds normal. No respiratory distress. She has no wheezes.   Abdominal: Soft. Normal appearance and bowel sounds are normal. She exhibits no distension and no mass. There is no hepatosplenomegaly. There is tenderness. There is no rigidity, no rebound and no guarding. No hernia.   RUQ pain, +ma sign   Genitourinary:   Genitourinary Comments: Rectal exam deferred   Musculoskeletal: Normal range of motion. She exhibits no edema or tenderness.   No atrophy of upper or lower extremities.  Normal digits and nails of both hands.   Lymphadenopathy:     She has no cervical adenopathy.   Neurological: She is alert and oriented to person, place, and time. She displays no atrophy. Coordination normal.   Skin: Skin is warm and dry. No rash noted. She is not diaphoretic. No erythema.   Psychiatric: She has a normal mood and affect. Her behavior is normal. Judgment and thought content normal.   Vitals reviewed.      WBC   Date Value Ref Range Status   04/05/2019 4.81 3.40 - 10.80 10*3/mm3 Final     RBC   Date Value Ref Range Status    04/05/2019 4.43 3.77 - 5.28 10*6/mm3 Final     Hemoglobin   Date Value Ref Range Status   04/05/2019 10.0 (L) 12.0 - 15.9 g/dL Final     Hematocrit   Date Value Ref Range Status   04/05/2019 33.6 (L) 34.0 - 46.6 % Final     MCV   Date Value Ref Range Status   04/05/2019 75.8 (L) 79.0 - 97.0 fL Final     MCH   Date Value Ref Range Status   04/05/2019 22.6 (L) 26.6 - 33.0 pg Final     MCHC   Date Value Ref Range Status   04/05/2019 29.8 (L) 31.5 - 35.7 g/dL Final     RDW   Date Value Ref Range Status   04/05/2019 15.2 12.3 - 15.4 % Final     RDW-SD   Date Value Ref Range Status   04/05/2019 41.8 37.0 - 54.0 fl Final     MPV   Date Value Ref Range Status   04/05/2019 10.9 6.0 - 12.0 fL Final     Platelets   Date Value Ref Range Status   04/05/2019 296 140 - 450 10*3/mm3 Final     Neutrophil %   Date Value Ref Range Status   04/05/2019 47.2 42.7 - 76.0 % Final     Lymphocyte %   Date Value Ref Range Status   04/05/2019 41.2 19.6 - 45.3 % Final     Monocyte %   Date Value Ref Range Status   04/05/2019 8.5 5.0 - 12.0 % Final     Eosinophil %   Date Value Ref Range Status   04/05/2019 2.3 0.3 - 6.2 % Final     Basophil %   Date Value Ref Range Status   04/05/2019 0.6 0.0 - 1.5 % Final     Immature Grans %   Date Value Ref Range Status   04/05/2019 0.2 0.0 - 0.5 % Final     Neutrophils, Absolute   Date Value Ref Range Status   04/05/2019 2.27 1.40 - 7.00 10*3/mm3 Final     Lymphocytes, Absolute   Date Value Ref Range Status   04/05/2019 1.98 0.70 - 3.10 10*3/mm3 Final     Monocytes, Absolute   Date Value Ref Range Status   04/05/2019 0.41 0.10 - 0.90 10*3/mm3 Final     Eosinophils, Absolute   Date Value Ref Range Status   04/05/2019 0.11 0.00 - 0.40 10*3/mm3 Final     Basophils, Absolute   Date Value Ref Range Status   04/05/2019 0.03 0.00 - 0.20 10*3/mm3 Final     Immature Grans, Absolute   Date Value Ref Range Status   04/05/2019 0.01 0.00 - 0.05 10*3/mm3 Final     nRBC   Date Value Ref Range Status   04/05/2019 0.0 0.0  - 0.0 /100 WBC Final       Lab Results   Component Value Date    GLUCOSE 92 04/05/2019    BUN 15 04/05/2019    CREATININE 0.90 04/05/2019    EGFRIFAFRI 84 04/05/2019    BCR 16.7 04/05/2019    CO2 28.1 04/05/2019    CALCIUM 9.4 04/05/2019    ALBUMIN 3.70 04/05/2019    AST 12 04/05/2019    ALT 13 04/05/2019         FINDINGS:  Axial CT imaging was obtained from the dome of the diaphragm to the  symphysis pubis. No IV contrast was administered.     Images through the lung bases demonstrate some mild left basilar  atelectasis. The stomach and proximal small bowel appear unremarkable,  as are the adrenal glands. No focal hepatic lesions are seen.  Gallbladder is normal. Spleen is within normal limits, as is the  pancreas. No renal stones are identified, and there is no  hydroureteronephrosis. There is no free fluid or adenopathy seen within  the abdomen. There is no evidence of mechanical bowel obstruction. The  appendix is visualized, and is within normal limits. The urinary bladder  is normal. Uterus is surgically absent. No free fluid or adenopathy seen  within the pelvis. Review of bony windows does not demonstrate any  aggressive osseous abnormalities. There is discogenic degenerative  disease noted at L5-S1.     IMPRESSION:  No acute intra-abdominal or intrapelvic process seen.     Radiation dose reduction techniques were utilized, including automated  exposure control and exposure modulation based on body size.     This report was finalized on 4/5/2019 2:26 AM by Dr. Toña Parekh M.D.       Assessment/Plan    Right upper quadrant pain: Recurrent.  I really think this is biliary dyskinesia.    Upper abdominal pain: Improved with stopping NSAIDs    Microcytic anemia: Chronic issue going back to 2013, she is status post hysterectomy    Plan  We will see if we can get a HIDA scan this time  Iron studies; may need to consider repeat EGD and colonoscopy for further evaluation    Isaiasice was seen today for abdominal  pain and nausea.    Diagnoses and all orders for this visit:    Pain of upper abdomen  -     NM Hepatobiliary Without CCK; Future    Right upper quadrant abdominal pain  -     NM Hepatobiliary Without CCK; Future    Microcytic anemia  -     Iron Profile; Future  -     Ferritin; Future        Dictated utilizing Dragon dictation

## 2019-04-10 ENCOUNTER — TELEPHONE (OUTPATIENT)
Dept: GASTROENTEROLOGY | Facility: CLINIC | Age: 39
End: 2019-04-10

## 2019-04-10 NOTE — TELEPHONE ENCOUNTER
----- Message from Kerwin Bray sent at 4/10/2019  8:05 AM EDT -----  Regarding: pt called with questions   Contact: [phone]  Pt is calling stating she was suppose to have a u/s done before Friday & nobody has gave her a call yet. & said she could also do the hida scan after

## 2019-04-10 NOTE — TELEPHONE ENCOUNTER
Called pt and pt reports that she has called scheduling at [facility] and has her us scheduled for 04/12.  Pt is asking if her hida has been approved. Advised per Suzi that she has to have her us first and then her insurance will cover the hida. Pt verb understanding.

## 2019-04-11 ENCOUNTER — TELEPHONE (OUTPATIENT)
Dept: GASTROENTEROLOGY | Facility: CLINIC | Age: 39
End: 2019-04-11

## 2019-04-11 NOTE — TELEPHONE ENCOUNTER
----- Message from Munira Angela MD sent at 4/11/2019 12:29 PM EDT -----  Please let her know that her iron studies are normal.  She has a microcytic anemia but she is not iron deficient.

## 2019-04-11 NOTE — TELEPHONE ENCOUNTER
Call to pt.  Advise per Dr Angela that iron studies are normal.  Has microcytic anemia, but not iron deficient.  Verb understanding.

## 2019-04-11 NOTE — PROGRESS NOTES
Please let her know that her iron studies are normal.  She has a microcytic anemia but she is not iron deficient.

## 2019-04-12 ENCOUNTER — HOSPITAL ENCOUNTER (OUTPATIENT)
Dept: ULTRASOUND IMAGING | Facility: HOSPITAL | Age: 39
Discharge: HOME | End: 2019-04-12
Admitting: INTERNAL MEDICINE

## 2019-04-12 DIAGNOSIS — R10.11 RUQ PAIN: ICD-10-CM

## 2019-04-12 PROCEDURE — 76705 ECHO EXAM OF ABDOMEN: CPT

## 2019-04-15 ENCOUNTER — TELEPHONE (OUTPATIENT)
Dept: GASTROENTEROLOGY | Facility: CLINIC | Age: 39
End: 2019-04-15

## 2019-04-15 ENCOUNTER — HOSPITAL ENCOUNTER (OUTPATIENT)
Dept: NUCLEAR MEDICINE | Facility: HOSPITAL | Age: 39
Discharge: HOME | End: 2019-04-15

## 2019-04-15 DIAGNOSIS — K82.8 BILIARY DYSKINESIA: Primary | ICD-10-CM

## 2019-04-15 DIAGNOSIS — R10.10 PAIN OF UPPER ABDOMEN: ICD-10-CM

## 2019-04-15 DIAGNOSIS — R10.11 RIGHT UPPER QUADRANT ABDOMINAL PAIN: ICD-10-CM

## 2019-04-15 PROCEDURE — A9537 TC99M MEBROFENIN: HCPCS | Performed by: INTERNAL MEDICINE

## 2019-04-15 PROCEDURE — 0 TECHNETIUM TC 99M MEBROFENIN KIT: Performed by: INTERNAL MEDICINE

## 2019-04-15 PROCEDURE — 78226 HEPATOBILIARY SYSTEM IMAGING: CPT

## 2019-04-15 RX ORDER — KIT FOR THE PREPARATION OF TECHNETIUM TC 99M MEBROFENIN 45 MG/10ML
1 INJECTION, POWDER, LYOPHILIZED, FOR SOLUTION INTRAVENOUS
Status: COMPLETED | OUTPATIENT
Start: 2019-04-15 | End: 2019-04-15

## 2019-04-15 RX ADMIN — MEBROFENIN 1 DOSE: 45 INJECTION, POWDER, LYOPHILIZED, FOR SOLUTION INTRAVENOUS at 10:15

## 2019-04-15 NOTE — TELEPHONE ENCOUNTER
HIDA shows decreased ejection fraction of the gallbladder    Referring to Dr Mathews in surgery to evaluate for surgery    Advise bland and low fat diet in the meantime

## 2019-04-15 NOTE — TELEPHONE ENCOUNTER
"Call to pt.  Advise per Dr Angela that US showed a normal appearing liver and GB.    Will follow HIDA results.    Advise hyoscyamine, tylenol prn pain in the meantime.    Pt verb understanding.  States has been using hyoscyamine and tylenol without relief.  Anxious for HIDA results - \"feeling bad since completed that this morning\".    Message to Dr Angela.  "

## 2019-04-15 NOTE — TELEPHONE ENCOUNTER
----- Message from Micky Koch sent at 4/15/2019 10:21 AM EDT -----  Regarding: NOT FEELING WELL   Contact: [phone]  PT IS ASKING FOR OFFICE TO READ REPORT TODAY. PT IS HAVING A LOT PAIN , PT HAD AN US ON FRI AND HAVING A TEST DONE TODAY

## 2019-04-15 NOTE — TELEPHONE ENCOUNTER
Her ultrsound showed a normal appearing liver and gallbladder.      Will follow the HIDA results    Advise hyoscyamine, tylenol, prn pain in the meantime

## 2019-04-15 NOTE — TELEPHONE ENCOUNTER
Call to pt.  Advise per DR Angela that HIDA shows decreased EJ of GB.    Referring to Dr Kelley in surgery to evaluate for surgery.      Advise bland and low fat  diet in the meantime.    Message to Scheduling.

## 2019-04-23 ENCOUNTER — TELEPHONE (OUTPATIENT)
Dept: GASTROENTEROLOGY | Facility: CLINIC | Age: 39
End: 2019-04-23

## 2019-04-24 ENCOUNTER — TELEPHONE (OUTPATIENT)
Dept: GASTROENTEROLOGY | Facility: CLINIC | Age: 39
End: 2019-04-24

## 2019-04-24 NOTE — TELEPHONE ENCOUNTER
----- Message from Elvia Barragan sent at 4/24/2019  1:05 PM EDT -----  Regarding: Direct patient to me  Hi there,    I know all of you have been working with this patient. If she communicates with the office please let me know. I'd like to be included in her care moving forward.    Thanks,  Elvia

## 2019-08-05 ENCOUNTER — APPOINTMENT (OUTPATIENT)
Dept: LAB | Facility: HOSPITAL | Age: 39
End: 2019-08-05

## 2019-08-05 ENCOUNTER — OFFICE VISIT (OUTPATIENT)
Dept: GASTROENTEROLOGY | Facility: CLINIC | Age: 39
End: 2019-08-05

## 2019-08-05 VITALS
SYSTOLIC BLOOD PRESSURE: 128 MMHG | BODY MASS INDEX: 33.13 KG/M2 | TEMPERATURE: 98 F | HEIGHT: 68 IN | WEIGHT: 218.6 LBS | DIASTOLIC BLOOD PRESSURE: 78 MMHG

## 2019-08-05 DIAGNOSIS — D50.9 MICROCYTIC ANEMIA: ICD-10-CM

## 2019-08-05 DIAGNOSIS — R10.11 RIGHT UPPER QUADRANT ABDOMINAL PAIN: Primary | ICD-10-CM

## 2019-08-05 DIAGNOSIS — K59.09 OTHER CONSTIPATION: ICD-10-CM

## 2019-08-05 LAB
ALBUMIN SERPL-MCNC: 3.7 G/DL (ref 3.5–5.2)
ALBUMIN/GLOB SERPL: 1.1 G/DL
ALP SERPL-CCNC: 60 U/L (ref 39–117)
ALT SERPL W P-5'-P-CCNC: 12 U/L (ref 1–33)
ANION GAP SERPL CALCULATED.3IONS-SCNC: 11.4 MMOL/L (ref 5–15)
ANISOCYTOSIS BLD QL: NORMAL
AST SERPL-CCNC: 14 U/L (ref 1–32)
BASOPHILS # BLD AUTO: 0.03 10*3/MM3 (ref 0–0.2)
BASOPHILS NFR BLD AUTO: 0.7 % (ref 0–1.5)
BILIRUB SERPL-MCNC: 0.2 MG/DL (ref 0.2–1.2)
BUN BLD-MCNC: 16 MG/DL (ref 6–20)
BUN/CREAT SERPL: 19.5 (ref 7–25)
CALCIUM SPEC-SCNC: 9 MG/DL (ref 8.6–10.5)
CHLORIDE SERPL-SCNC: 102 MMOL/L (ref 98–107)
CO2 SERPL-SCNC: 27.6 MMOL/L (ref 22–29)
CREAT BLD-MCNC: 0.82 MG/DL (ref 0.57–1)
DEPRECATED RDW RBC AUTO: 41.7 FL (ref 37–54)
EOSINOPHIL # BLD AUTO: 0.16 10*3/MM3 (ref 0–0.4)
EOSINOPHIL NFR BLD AUTO: 4 % (ref 0.3–6.2)
ERYTHROCYTE [DISTWIDTH] IN BLOOD BY AUTOMATED COUNT: 15.5 % (ref 12.3–15.4)
FERRITIN SERPL-MCNC: 77.9 NG/ML (ref 13–150)
GFR SERPL CREATININE-BSD FRML MDRD: 94 ML/MIN/1.73
GLOBULIN UR ELPH-MCNC: 3.3 GM/DL
GLUCOSE BLD-MCNC: 86 MG/DL (ref 65–99)
HCT VFR BLD AUTO: 34.7 % (ref 34–46.6)
HGB BLD-MCNC: 10.3 G/DL (ref 12–15.9)
IMM GRANULOCYTES # BLD AUTO: 0.01 10*3/MM3 (ref 0–0.05)
IMM GRANULOCYTES NFR BLD AUTO: 0.2 % (ref 0–0.5)
LYMPHOCYTES # BLD AUTO: 1.38 10*3/MM3 (ref 0.7–3.1)
LYMPHOCYTES NFR BLD AUTO: 34.1 % (ref 19.6–45.3)
MCH RBC QN AUTO: 22.2 PG (ref 26.6–33)
MCHC RBC AUTO-ENTMCNC: 29.7 G/DL (ref 31.5–35.7)
MCV RBC AUTO: 74.8 FL (ref 79–97)
MICROCYTES BLD QL: NORMAL
MONOCYTES # BLD AUTO: 0.46 10*3/MM3 (ref 0.1–0.9)
MONOCYTES NFR BLD AUTO: 11.4 % (ref 5–12)
NEUTROPHILS # BLD AUTO: 2.01 10*3/MM3 (ref 1.7–7)
NEUTROPHILS NFR BLD AUTO: 49.6 % (ref 42.7–76)
NRBC BLD AUTO-RTO: 0 /100 WBC (ref 0–0.2)
OVALOCYTES BLD QL SMEAR: NORMAL
PLAT MORPH BLD: NORMAL
PLATELET # BLD AUTO: 309 10*3/MM3 (ref 140–450)
PMV BLD AUTO: 10.8 FL (ref 6–12)
POTASSIUM BLD-SCNC: 3.8 MMOL/L (ref 3.5–5.2)
PROT SERPL-MCNC: 7 G/DL (ref 6–8.5)
RBC # BLD AUTO: 4.64 10*6/MM3 (ref 3.77–5.28)
SODIUM BLD-SCNC: 141 MMOL/L (ref 136–145)
WBC MORPH BLD: NORMAL
WBC NRBC COR # BLD: 4.05 10*3/MM3 (ref 3.4–10.8)

## 2019-08-05 PROCEDURE — 36415 COLL VENOUS BLD VENIPUNCTURE: CPT | Performed by: INTERNAL MEDICINE

## 2019-08-05 PROCEDURE — 82728 ASSAY OF FERRITIN: CPT | Performed by: INTERNAL MEDICINE

## 2019-08-05 PROCEDURE — 80053 COMPREHEN METABOLIC PANEL: CPT | Performed by: INTERNAL MEDICINE

## 2019-08-05 PROCEDURE — 85007 BL SMEAR W/DIFF WBC COUNT: CPT | Performed by: INTERNAL MEDICINE

## 2019-08-05 PROCEDURE — 85025 COMPLETE CBC W/AUTO DIFF WBC: CPT | Performed by: INTERNAL MEDICINE

## 2019-08-05 PROCEDURE — 99214 OFFICE O/P EST MOD 30 MIN: CPT | Performed by: INTERNAL MEDICINE

## 2019-08-05 RX ORDER — SODIUM CHLORIDE, SODIUM LACTATE, POTASSIUM CHLORIDE, CALCIUM CHLORIDE 600; 310; 30; 20 MG/100ML; MG/100ML; MG/100ML; MG/100ML
30 INJECTION, SOLUTION INTRAVENOUS CONTINUOUS
Status: CANCELLED | OUTPATIENT
Start: 2019-09-25

## 2019-08-05 NOTE — PROGRESS NOTES
"Chief Complaint   Patient presents with   • Abdominal Pain   • Nausea   • Constipation     Subjective     HPI  Keerthi Graff is a 39 y.o. female who presents for follow-up of abdominal pain, diarrhea, and dyspepsia.  She was first seen back in August 2018.  She had done better following stopping NSAIDs -- she had been taking multiple doses of ibuprofen for neck issues.  She persisted with upper abdominal pain.  Evaluation included a HIDA scan showing a low EF, she ended up having a cholecystectomy at [facility] in April.    Today back with persistent GI complaints-- Complains of intermittent constipation-- BMs QOD but not taking something everyday.  Occasionally diarrhea    Still with RUQ pain, ocurring every day, tender and like a knot.  Worse with dairy.  She does take hyoscyamine    No recent endoscopy.  Denies recent NSAIDs    He does have a chronic microcytic anemia.  Iron studies back in April showed a normal ferritin level        Past Medical History:   Diagnosis Date   • Delayed gastric emptying    • GERD (gastroesophageal reflux disease)    • Hiatal hernia        Social History     Socioeconomic History   • Marital status:      Spouse name: Not on file   • Number of children: Not on file   • Years of education: Not on file   • Highest education level: Not on file   Tobacco Use   • Smoking status: Former Smoker   • Smokeless tobacco: Never Used   Substance and Sexual Activity   • Alcohol use: Yes     Comment: \"socially\" \"once a month\"   • Drug use: Yes     Frequency: 4.0 times per week     Types: Marijuana   • Sexual activity: Defer         Current Outpatient Medications:   •  Cholecalciferol (VITAMIN D) 1000 units tablet, Vitamin D, Disp: , Rfl:   •  diclofenac (VOLTAREN) 1 % gel gel, Place 2 g on the skin as directed by provider., Disp: , Rfl:   •  HYDROcodone-acetaminophen (NORCO) 7.5-325 MG per tablet, Take  by mouth As Needed., Disp: , Rfl: 0  •  hyoscyamine (ANASPAZ,LEVSIN) 0.125 MG " tablet, Take 1 tablet by mouth Every 6 (Six) Hours As Needed for Cramping (abdominal pain)., Disp: 90 tablet, Rfl: 1  •  lisinopril-hydrochlorothiazide (PRINZIDE,ZESTORETIC) 20-12.5 MG per tablet, lisinopril 20 mg-hydrochlorothiazide 12.5 mg tablet  Take 1 tablet every day by oral route., Disp: , Rfl:   •  methocarbamol (ROBAXIN) 500 MG tablet, Take 500 mg by mouth As Needed., Disp: , Rfl:   •  metoprolol tartrate (LOPRESSOR) 25 MG tablet, Take 25 mg by mouth Daily., Disp: , Rfl:   •  pantoprazole (PROTONIX) 40 MG EC tablet, Take 40 mg by mouth 2 (Two) Times a Day., Disp: , Rfl:   •  flavoxATE (URISPAS) 100 MG tablet, Take 1 tablet by mouth 3 (Three) Times a Day As Needed for bladder spasms., Disp: 12 tablet, Rfl: 0  •  Nitrofurantoin Macrocrystal (MACRODANTIN PO), Take  by mouth Daily., Disp: , Rfl:   •  traMADol (ULTRAM) 50 MG tablet, Take 1 tablet by mouth Every 6 (Six) Hours As Needed for Moderate Pain ., Disp: 15 tablet, Rfl: 0    Review of Systems   Constitutional: Negative for activity change, appetite change, chills and fever.   HENT: Negative for trouble swallowing.    Respiratory: Negative.    Cardiovascular: Negative.  Negative for chest pain.   Gastrointestinal: Positive for abdominal pain, constipation and diarrhea. Negative for abdominal distention, anal bleeding, nausea and vomiting.   Genitourinary: Negative for dysuria, frequency and hematuria.       Objective   Vitals:    08/05/19 0809   BP: 128/78   Temp: 98 °F (36.7 °C)         08/05/19  0809   Weight: 99.2 kg (218 lb 9.6 oz)     Body mass index is 33.24 kg/m².      Physical Exam   Constitutional: She is oriented to person, place, and time. She appears well-developed and well-nourished. No distress.   HENT:   Head: Normocephalic and atraumatic.   Right Ear: External ear normal.   Left Ear: External ear normal.   Nose: Nose normal.   Mouth/Throat: Oropharynx is clear and moist.   Eyes: Conjunctivae and EOM are normal. Right eye exhibits no  discharge. Left eye exhibits no discharge. No scleral icterus.   Neck: Normal range of motion. Neck supple. No thyromegaly present.   No supraclavicular adenopathy   Cardiovascular: Normal rate, regular rhythm, normal heart sounds and intact distal pulses. Exam reveals no gallop.   No murmur heard.  No lower extremity edema   Pulmonary/Chest: Effort normal and breath sounds normal. No respiratory distress. She has no wheezes.   Abdominal: Soft. Normal appearance and bowel sounds are normal. She exhibits no distension and no mass. There is no hepatosplenomegaly. There is tenderness. There is no rigidity, no rebound and no guarding. No hernia.   RUQ pain, obese   Genitourinary:   Genitourinary Comments: Rectal exam deferred   Musculoskeletal: Normal range of motion. She exhibits no edema or tenderness.   No atrophy of upper or lower extremities.  Normal digits and nails of both hands.   Lymphadenopathy:     She has no cervical adenopathy.   Neurological: She is alert and oriented to person, place, and time. She displays no atrophy. Coordination normal.   Skin: Skin is warm and dry. No rash noted. She is not diaphoretic. No erythema.   Psychiatric: She has a normal mood and affect. Her behavior is normal. Judgment and thought content normal.   Vitals reviewed.      WBC   Date Value Ref Range Status   04/05/2019 4.81 3.40 - 10.80 10*3/mm3 Final     RBC   Date Value Ref Range Status   04/05/2019 4.43 3.77 - 5.28 10*6/mm3 Final     Hemoglobin   Date Value Ref Range Status   04/05/2019 10.0 (L) 12.0 - 15.9 g/dL Final     Hematocrit   Date Value Ref Range Status   04/05/2019 33.6 (L) 34.0 - 46.6 % Final     MCV   Date Value Ref Range Status   04/05/2019 75.8 (L) 79.0 - 97.0 fL Final     MCH   Date Value Ref Range Status   04/05/2019 22.6 (L) 26.6 - 33.0 pg Final     MCHC   Date Value Ref Range Status   04/05/2019 29.8 (L) 31.5 - 35.7 g/dL Final     RDW   Date Value Ref Range Status   04/05/2019 15.2 12.3 - 15.4 % Final      RDW-SD   Date Value Ref Range Status   04/05/2019 41.8 37.0 - 54.0 fl Final     MPV   Date Value Ref Range Status   04/05/2019 10.9 6.0 - 12.0 fL Final     Platelets   Date Value Ref Range Status   04/05/2019 296 140 - 450 10*3/mm3 Final     Neutrophil %   Date Value Ref Range Status   04/05/2019 47.2 42.7 - 76.0 % Final     Lymphocyte %   Date Value Ref Range Status   04/05/2019 41.2 19.6 - 45.3 % Final     Monocyte %   Date Value Ref Range Status   04/05/2019 8.5 5.0 - 12.0 % Final     Eosinophil %   Date Value Ref Range Status   04/05/2019 2.3 0.3 - 6.2 % Final     Basophil %   Date Value Ref Range Status   04/05/2019 0.6 0.0 - 1.5 % Final     Immature Grans %   Date Value Ref Range Status   04/05/2019 0.2 0.0 - 0.5 % Final     Neutrophils, Absolute   Date Value Ref Range Status   04/05/2019 2.27 1.40 - 7.00 10*3/mm3 Final     Lymphocytes, Absolute   Date Value Ref Range Status   04/05/2019 1.98 0.70 - 3.10 10*3/mm3 Final     Monocytes, Absolute   Date Value Ref Range Status   04/05/2019 0.41 0.10 - 0.90 10*3/mm3 Final     Eosinophils, Absolute   Date Value Ref Range Status   04/05/2019 0.11 0.00 - 0.40 10*3/mm3 Final     Basophils, Absolute   Date Value Ref Range Status   04/05/2019 0.03 0.00 - 0.20 10*3/mm3 Final     Immature Grans, Absolute   Date Value Ref Range Status   04/05/2019 0.01 0.00 - 0.05 10*3/mm3 Final     nRBC   Date Value Ref Range Status   04/05/2019 0.0 0.0 - 0.0 /100 WBC Final       Lab Results   Component Value Date    GLUCOSE 92 04/05/2019    BUN 12 04/16/2019    CREATININE 0.7 04/16/2019    EGFRIFAFRI 84 04/05/2019    BCR 17.1 04/16/2019    CO2 28 04/16/2019    CALCIUM 9.8 04/16/2019    ALBUMIN 4.7 04/16/2019    LABIL2 1.2 04/16/2019    AST 19 04/16/2019    ALT 27 04/16/2019       CT abd    FINDINGS:  Axial CT imaging was obtained from the dome of the diaphragm to the  symphysis pubis. No IV contrast was administered.     Images through the lung bases demonstrate some mild left  basilar  atelectasis. The stomach and proximal small bowel appear unremarkable,  as are the adrenal glands. No focal hepatic lesions are seen.  Gallbladder is normal. Spleen is within normal limits, as is the  pancreas. No renal stones are identified, and there is no  hydroureteronephrosis. There is no free fluid or adenopathy seen within  the abdomen. There is no evidence of mechanical bowel obstruction. The  appendix is visualized, and is within normal limits. The urinary bladder  is normal. Uterus is surgically absent. No free fluid or adenopathy seen  within the pelvis. Review of bony windows does not demonstrate any  aggressive osseous abnormalities. There is discogenic degenerative  disease noted at L5-S1.     IMPRESSION:  No acute intra-abdominal or intrapelvic process seen.     Radiation dose reduction techniques were utilized, including automated  exposure control and exposure modulation based on body size.     This report was finalized on 4/5/2019 2:26 AM by Dr. Toña Parekh M.D.    HIDA  IMPRESSION:  Impression:  No evidence for cystic duct or common duct obstruction.  Gallbladder  ejection fraction after giving 8 ounces Boost was calculated to be 27%  which is diminished and this may be associated with biliary dyskinesia  or chronic cholecystitis..     This report was finalized on 4/15/2019 2:52 PM by Dr. Ricardo Matthew M.D.            Assessment/Plan    Persistent right upper quadrant pain: Now status post cholecystectomy for biliary dyskinesia.  I suspect she either has gastritis/duodenitis versus functional dyspepsia    Constipation: Functional versus related to hyoscyamine    Microcytic anemia: With normal ferritin, she is status post hysterectomy    Plan  Repeat CBC, CMP, ferritin today  We will plan for EGD for further evaluation of her upper GI symptoms  Daily Benefiber for bowel regularity    Canice was seen today for abdominal pain, nausea and constipation.    Diagnoses and all orders  for this visit:    Right upper quadrant abdominal pain  -     CBC & Differential  -     Comprehensive Metabolic Panel  -     Ferritin  -     Case Request; Standing  -     Follow Anesthesia Guidelines / Standing Orders; Future  -     Obtain Informed Consent; Future  -     Implement Anesthesia Orders Day of Procedure; Standing  -     Obtain Informed Consent; Standing  -     lactated ringers infusion  -     Case Request  -     CBC Auto Differential    Other constipation  -     CBC & Differential  -     Comprehensive Metabolic Panel  -     Ferritin  -     CBC Auto Differential    Microcytic anemia  -     CBC & Differential  -     Comprehensive Metabolic Panel  -     Ferritin  -     CBC Auto Differential        Dictated utilizing Dragon dictation

## 2019-08-05 NOTE — PATIENT INSTRUCTIONS
Schedule the EGD    Labs today    Daily fiber supplement-- like benefiber-- one to 2 doses a day

## 2019-08-08 DIAGNOSIS — D50.9 MICROCYTIC ANEMIA: Primary | ICD-10-CM

## 2019-08-08 NOTE — PROGRESS NOTES
Her labs are stable.  Her blood count remains slightly low and the size of the blood cells are small, but her iron stores are normal.  I don't know what is causing this but am referring her to hematology to further evaluate this

## 2019-08-09 ENCOUNTER — TELEPHONE (OUTPATIENT)
Dept: GASTROENTEROLOGY | Facility: CLINIC | Age: 39
End: 2019-08-09

## 2019-08-09 NOTE — TELEPHONE ENCOUNTER
----- Message from Munira Angela MD sent at 8/8/2019  5:55 PM EDT -----  Her labs are stable.  Her blood count remains slightly low and the size of the blood cells are small, but her iron stores are normal.  I don't know what is causing this but am referring her to hematology to further evaluate this

## 2019-08-09 NOTE — TELEPHONE ENCOUNTER
----- Message from Micky Koch sent at 8/9/2019 11:52 AM EDT -----  Regarding: STOOL  Contact: [phone]  PT CALLED WITH QUESTIONS ABOUT STOOL SMELL

## 2019-08-09 NOTE — TELEPHONE ENCOUNTER
Call to pt . Advise per Dr Angela that labs are stable.  Blood count remains slightly low and the size of the blood cells are small, but iron stores are normal.  Dr Angela doesn't know what is causing this, but am referring to hematology to further evaluate this.      Advise [facility] office will be contacting to arrange visit.  Verb understanding.

## 2019-08-09 NOTE — TELEPHONE ENCOUNTER
Called pt and pt reports that when she saw Dr Angela she forgot to mention to Dr Angela that her stool has a foul odor.  She states her stools smells like lye.  Pt denies any changes in her medications or her diet.  Also pt denies any recent use of antibx.  Pt states her bm 's are of normal consistency.  She denies a fever and chills but reports that her stomach is still very tender and hurts everyday.  She also reports fatigue.  Pt has started the fiber supp.  Advised pt would send message to Dr Angela. Pt verb understanding.

## 2019-09-19 ENCOUNTER — CONSULT (OUTPATIENT)
Dept: ONCOLOGY | Facility: CLINIC | Age: 39
End: 2019-09-19

## 2019-09-19 ENCOUNTER — APPOINTMENT (OUTPATIENT)
Dept: LAB | Facility: HOSPITAL | Age: 39
End: 2019-09-19

## 2019-09-19 VITALS
OXYGEN SATURATION: 100 % | TEMPERATURE: 99.2 F | RESPIRATION RATE: 16 BRPM | WEIGHT: 219.2 LBS | HEIGHT: 67 IN | SYSTOLIC BLOOD PRESSURE: 122 MMHG | HEART RATE: 76 BPM | DIASTOLIC BLOOD PRESSURE: 78 MMHG | BODY MASS INDEX: 34.4 KG/M2

## 2019-09-19 DIAGNOSIS — R53.82 CHRONIC FATIGUE: ICD-10-CM

## 2019-09-19 DIAGNOSIS — D50.9 MICROCYTIC ANEMIA: Primary | ICD-10-CM

## 2019-09-19 LAB
BASOPHILS # BLD AUTO: 0.05 10*3/MM3 (ref 0–0.2)
BASOPHILS NFR BLD AUTO: 1 % (ref 0–1.5)
DEPRECATED RDW RBC AUTO: 37.6 FL (ref 37–54)
EOSINOPHIL # BLD AUTO: 0.12 10*3/MM3 (ref 0–0.4)
EOSINOPHIL NFR BLD AUTO: 2.3 % (ref 0.3–6.2)
ERYTHROCYTE [DISTWIDTH] IN BLOOD BY AUTOMATED COUNT: 14.4 % (ref 12.3–15.4)
FOLATE SERPL-MCNC: 14.6 NG/ML (ref 4.78–24.2)
HCT VFR BLD AUTO: 36.5 % (ref 34–46.6)
HGB BLD-MCNC: 11.7 G/DL (ref 12–15.9)
HGB RETIC QN AUTO: 26.5 PG (ref 29.8–36.1)
IMM GRANULOCYTES # BLD AUTO: 0.03 10*3/MM3 (ref 0–0.05)
IMM GRANULOCYTES NFR BLD AUTO: 0.6 % (ref 0–0.5)
IMM RETICS NFR: 7.3 % (ref 3–15.8)
IRON 24H UR-MRATE: 69 MCG/DL (ref 37–145)
IRON SATN MFR SERPL: 24 % (ref 14–48)
LYMPHOCYTES # BLD AUTO: 1.82 10*3/MM3 (ref 0.7–3.1)
LYMPHOCYTES NFR BLD AUTO: 35.5 % (ref 19.6–45.3)
MCH RBC QN AUTO: 23.4 PG (ref 26.6–33)
MCHC RBC AUTO-ENTMCNC: 32.1 G/DL (ref 31.5–35.7)
MCV RBC AUTO: 72.9 FL (ref 79–97)
MONOCYTES # BLD AUTO: 0.68 10*3/MM3 (ref 0.1–0.9)
MONOCYTES NFR BLD AUTO: 13.3 % (ref 5–12)
NEUTROPHILS # BLD AUTO: 2.43 10*3/MM3 (ref 1.7–7)
NEUTROPHILS NFR BLD AUTO: 47.3 % (ref 42.7–76)
NRBC BLD AUTO-RTO: 0 /100 WBC (ref 0–0.2)
PLATELET # BLD AUTO: 293 10*3/MM3 (ref 140–450)
PMV BLD AUTO: 11.3 FL (ref 6–12)
RBC # BLD AUTO: 5.01 10*6/MM3 (ref 3.77–5.28)
RETICS/RBC NFR AUTO: 1.25 % (ref 0.7–1.9)
TIBC SERPL-MCNC: 290 MCG/DL (ref 249–505)
TRANSFERRIN SERPL-MCNC: 207 MG/DL (ref 200–360)
VIT B12 BLD-MCNC: 598 PG/ML (ref 211–946)
WBC NRBC COR # BLD: 5.13 10*3/MM3 (ref 3.4–10.8)

## 2019-09-19 PROCEDURE — G0463 HOSPITAL OUTPT CLINIC VISIT: HCPCS | Performed by: INTERNAL MEDICINE

## 2019-09-19 PROCEDURE — 82746 ASSAY OF FOLIC ACID SERUM: CPT | Performed by: INTERNAL MEDICINE

## 2019-09-19 PROCEDURE — 85025 COMPLETE CBC W/AUTO DIFF WBC: CPT | Performed by: INTERNAL MEDICINE

## 2019-09-19 PROCEDURE — 36415 COLL VENOUS BLD VENIPUNCTURE: CPT | Performed by: INTERNAL MEDICINE

## 2019-09-19 PROCEDURE — 85046 RETICYTE/HGB CONCENTRATE: CPT | Performed by: INTERNAL MEDICINE

## 2019-09-19 PROCEDURE — 83540 ASSAY OF IRON: CPT | Performed by: INTERNAL MEDICINE

## 2019-09-19 PROCEDURE — 36416 COLLJ CAPILLARY BLOOD SPEC: CPT | Performed by: INTERNAL MEDICINE

## 2019-09-19 PROCEDURE — 84466 ASSAY OF TRANSFERRIN: CPT | Performed by: INTERNAL MEDICINE

## 2019-09-19 PROCEDURE — 82607 VITAMIN B-12: CPT | Performed by: INTERNAL MEDICINE

## 2019-09-19 PROCEDURE — 99203 OFFICE O/P NEW LOW 30 MIN: CPT | Performed by: INTERNAL MEDICINE

## 2019-09-19 RX ORDER — METHYLPREDNISOLONE 4 MG/1
TABLET ORAL
Refills: 0 | COMMUNITY
Start: 2019-09-05 | End: 2020-07-29 | Stop reason: SDDI

## 2019-09-19 NOTE — PROGRESS NOTES
Subjective     REASON FOR CONSULTATION:  Provide an opinion on any further workup or treatment on:    Anemia                       REQUESTING PHYSICIAN: Munira Angela MD    RECORDS OBTAINED: Records of the patients history including those obtained from the referring provider were reviewed and summarized in detail.    HISTORY OF PRESENT ILLNESS:    Keerthi Graff is a 39 y.o. patient who was referred for evaluation of anemia.  She was noted to be anemic with a hemoglobin of 10.0 with an MCV of 75.8 in April 2019 when she had problems with her gallbladder.  She had a cholecystectomy and started changing her diet afterwards.  She started to eat more vegetables and less fried food.  Repeat labs on 8/5/2019 showed a hemoglobin of 10.3.  She was referred to our office to further evaluate the anemia.    Patient reports fatigue.  She used to be very active and exercises regularly at the gym.  However, she is now having hard time exercising as a result of the fatigue.    Patient is not noticing any blood in the stool.  No melena.  She is going to have endoscopies in 1 week.       REVIEW OF SYSTEMS:  Review of Systems   Constitutional: Positive for fatigue and unexpected weight change. Negative for chills and fever.   HENT: Negative for mouth sores, nosebleeds, sore throat and voice change.    Eyes: Negative for visual disturbance.   Respiratory: Negative for cough and shortness of breath.    Cardiovascular: Negative for chest pain and leg swelling.   Gastrointestinal: Positive for abdominal pain, nausea and vomiting. Negative for blood in stool, constipation and diarrhea.   Genitourinary: Negative for dysuria, frequency and hematuria.   Musculoskeletal: Positive for back pain and joint swelling. Negative for arthralgias.   Skin: Negative for rash.   Neurological: Negative for dizziness, numbness and headaches.   Hematological: Negative for adenopathy. Does not bruise/bleed easily.   Psychiatric/Behavioral: Negative for  "dysphoric mood. The patient is not nervous/anxious.        Past Medical History:   Diagnosis Date   • Arthritis    • Delayed gastric emptying    • GERD (gastroesophageal reflux disease)    • Hiatal hernia    • Hiatal hernia    • Hypertension    • Seasonal allergies        Past Surgical History:   Procedure Laterality Date   • BILATERAL BREAST REDUCTION     •  SECTION      x2   • CHOLECYSTECTOMY  2019   • COLONOSCOPY  2013    normal   • CYST REMOVAL Right     x2   • DILATATION AND CURETTAGE     • ENDOMETRIAL ABLATION  2015   • GANGLION CYST EXCISION Right    • HYSTERECTOMY     • UPPER GASTROINTESTINAL ENDOSCOPY  2013    [facility]       Social History     Socioeconomic History   • Marital status:      Spouse name: Not on file   • Number of children: Not on file   • Years of education: Not on file   • Highest education level: Not on file   Tobacco Use   • Smoking status: Former Smoker   • Smokeless tobacco: Never Used   Substance and Sexual Activity   • Alcohol use: Yes     Comment: \"socially\" \"once a month\"   • Drug use: Yes     Frequency: 4.0 times per week     Types: Marijuana   • Sexual activity: Defer       Cancer-related family history is not on file.    MEDICATIONS:    Current Outpatient Medications:   •  Cholecalciferol (VITAMIN D) 1000 units tablet, Vitamin D, Disp: , Rfl:   •  diclofenac (VOLTAREN) 1 % gel gel, Place 2 g on the skin as directed by provider., Disp: , Rfl:   •  HYDROcodone-acetaminophen (NORCO) 7.5-325 MG per tablet, Take  by mouth As Needed., Disp: , Rfl: 0  •  hyoscyamine (ANASPAZ,LEVSIN) 0.125 MG tablet, Take 1 tablet by mouth Every 6 (Six) Hours As Needed for Cramping (abdominal pain)., Disp: 90 tablet, Rfl: 1  •  lisinopril-hydrochlorothiazide (PRINZIDE,ZESTORETIC) 20-12.5 MG per tablet, lisinopril 20 mg-hydrochlorothiazide 12.5 mg tablet  Take 1 tablet every day by oral route., Disp: , Rfl:   •  methocarbamol (ROBAXIN) 500 MG tablet, Take 500 mg by mouth As Needed., Disp: , " "Rfl:   •  methylPREDNISolone (MEDROL, ANIBAL,) 4 MG tablet, FPD, Disp: , Rfl: 0  •  metoprolol tartrate (LOPRESSOR) 25 MG tablet, Take 25 mg by mouth Daily., Disp: , Rfl:   •  Nitrofurantoin Macrocrystal (MACRODANTIN PO), Take  by mouth Daily., Disp: , Rfl:   •  pantoprazole (PROTONIX) 40 MG EC tablet, Take 40 mg by mouth 2 (Two) Times a Day., Disp: , Rfl:   •  traMADol (ULTRAM) 50 MG tablet, Take 1 tablet by mouth Every 6 (Six) Hours As Needed for Moderate Pain ., Disp: 15 tablet, Rfl: 0  •  flavoxATE (URISPAS) 100 MG tablet, Take 1 tablet by mouth 3 (Three) Times a Day As Needed for bladder spasms., Disp: 12 tablet, Rfl: 0     ALLERGIES:  Allergies   Allergen Reactions   • Ibuprofen GI Intolerance   • Sulfamethoxazole-Trimethoprim Hives        Objective   VITAL SIGNS:  Vitals:    09/19/19 0817   BP: 122/78   Pulse: 76   Resp: 16   Temp: 99.2 °F (37.3 °C)   TempSrc: Oral   SpO2: 100%   Weight: 99.4 kg (219 lb 3.2 oz)   Height: 170 cm (66.93\")  Comment: new ht without shoes   PainSc:   3   PainLoc: Abdomen       Wt Readings from Last 3 Encounters:   09/19/19 99.4 kg (219 lb 3.2 oz)   08/05/19 99.2 kg (218 lb 9.6 oz)   04/08/19 99.3 kg (219 lb)       PHYSICAL EXAMINATION  GENERAL:  The patient appears in good general condition, not in acute distress.  SKIN: Warm and dry. No skin rashes. No ecchymosis.  HEAD:  Normocephalic.  EYES:  No Jaundice. No Pallor. Pupils equal. EOMI.  NECK:  Supple with Good ROM. No Thyromegaly. No Masses.  LYMPHATICS:  No cervical or supraclavicular lymphadenopathy.  CHEST: Normal respiratory effort. Lungs clear to auscultation.   CARDIAC:  Normal S1 & S2. No murmur. No edema.  ABDOMEN:  Soft. No tenderness. No Hepatomegaly. No Splenomegaly. No masses.  EXTREMITIES:  No clubbing. No deforming arthritis in the hands.  NEUROLOGICAL:  No Focal neurological deficits.         RESULT REVIEW:   Results from last 7 days   Lab Units 09/19/19  0804   WBC 10*3/mm3 5.13   NEUTROS ABS 10*3/mm3 2.43 "   HEMOGLOBIN g/dL 11.7*   HEMATOCRIT % 36.5   PLATELETS 10*3/mm3 293     I reviewed the peripheral blood smear from today.  RBCs are microcytic.  Target cells were identified.    Lab Results   Component Value Date    FERRITIN 77.90 08/05/2019    FERRITIN 65.70 04/08/2019    IRON 98 04/08/2019    TIBC 289 (L) 04/08/2019          Assessment/Plan   Microcytic hypochromic anemia.  Of note is that the RBC count is not low.  An underlying thalassemia (alpha versus beta thalassemia) is suspected.  Patient is reporting significant fatigue.  This would not be expected in alpha thalassemia or beta thalassemia trait. Other contributing factors to the anemia may be present.  She may have a concomitant vitamin B12, folate or iron deficiency contributing to the anemia and fatigue.    PLAN:    1.  I will obtain B12, folate, methylmalonic acid levels, iron panel.  2.  I will obtain hemoglobin electrophoresis and alpha thalassemia gene test.  We will contact her with the results and start vitamin D replacement accordingly.  3.  Patient is going to have endoscopies in 1 week.    I will see her in follow-up in 6 months with repeat CBC.      Jaja Shen MD  09/19/19

## 2019-09-20 LAB
HGB A MFR BLD: 97.8 % (ref 96.4–98.8)
HGB A2 MFR BLD COLUMN CHROM: 2.2 % (ref 1.8–3.2)
HGB C MFR BLD: 0 %
HGB F MFR BLD: 0 % (ref 0–2)
HGB FRACT BLD-IMP: NORMAL
HGB S BLD QL SOLY: NEGATIVE
HGB S MFR BLD: 0 %

## 2019-09-24 LAB
Lab: NORMAL
METHYLMALONATE SERPL-SCNC: 221 NMOL/L (ref 0–378)

## 2019-09-25 ENCOUNTER — ANESTHESIA EVENT (OUTPATIENT)
Dept: GASTROENTEROLOGY | Facility: HOSPITAL | Age: 39
End: 2019-09-25

## 2019-09-25 ENCOUNTER — ANESTHESIA (OUTPATIENT)
Dept: GASTROENTEROLOGY | Facility: HOSPITAL | Age: 39
End: 2019-09-25

## 2019-09-25 ENCOUNTER — HOSPITAL ENCOUNTER (OUTPATIENT)
Facility: HOSPITAL | Age: 39
Setting detail: HOSPITAL OUTPATIENT SURGERY
Discharge: HOME | End: 2019-09-25
Attending: INTERNAL MEDICINE | Admitting: INTERNAL MEDICINE

## 2019-09-25 VITALS
BODY MASS INDEX: 34.23 KG/M2 | TEMPERATURE: 98.7 F | HEIGHT: 67 IN | RESPIRATION RATE: 16 BRPM | SYSTOLIC BLOOD PRESSURE: 133 MMHG | OXYGEN SATURATION: 98 % | DIASTOLIC BLOOD PRESSURE: 70 MMHG | WEIGHT: 218.1 LBS | HEART RATE: 71 BPM

## 2019-09-25 DIAGNOSIS — R10.11 RIGHT UPPER QUADRANT ABDOMINAL PAIN: ICD-10-CM

## 2019-09-25 PROCEDURE — S0260 H&P FOR SURGERY: HCPCS | Performed by: INTERNAL MEDICINE

## 2019-09-25 PROCEDURE — 25010000002 PROPOFOL 10 MG/ML EMULSION: Performed by: ANESTHESIOLOGY

## 2019-09-25 PROCEDURE — 43239 EGD BIOPSY SINGLE/MULTIPLE: CPT | Performed by: INTERNAL MEDICINE

## 2019-09-25 PROCEDURE — 88305 TISSUE EXAM BY PATHOLOGIST: CPT | Performed by: INTERNAL MEDICINE

## 2019-09-25 RX ORDER — PROPOFOL 10 MG/ML
VIAL (ML) INTRAVENOUS CONTINUOUS PRN
Status: DISCONTINUED | OUTPATIENT
Start: 2019-09-25 | End: 2019-09-25 | Stop reason: SURG

## 2019-09-25 RX ORDER — PROPOFOL 10 MG/ML
VIAL (ML) INTRAVENOUS AS NEEDED
Status: DISCONTINUED | OUTPATIENT
Start: 2019-09-25 | End: 2019-09-25 | Stop reason: SURG

## 2019-09-25 RX ORDER — LIDOCAINE HYDROCHLORIDE 20 MG/ML
INJECTION, SOLUTION INFILTRATION; PERINEURAL AS NEEDED
Status: DISCONTINUED | OUTPATIENT
Start: 2019-09-25 | End: 2019-09-25 | Stop reason: SURG

## 2019-09-25 RX ORDER — SODIUM CHLORIDE, SODIUM LACTATE, POTASSIUM CHLORIDE, CALCIUM CHLORIDE 600; 310; 30; 20 MG/100ML; MG/100ML; MG/100ML; MG/100ML
30 INJECTION, SOLUTION INTRAVENOUS CONTINUOUS
Status: DISCONTINUED | OUTPATIENT
Start: 2019-09-25 | End: 2019-09-25 | Stop reason: HOSPADM

## 2019-09-25 RX ADMIN — PROPOFOL 150 MG: 10 INJECTION, EMULSION INTRAVENOUS at 10:50

## 2019-09-25 RX ADMIN — SODIUM CHLORIDE, POTASSIUM CHLORIDE, SODIUM LACTATE AND CALCIUM CHLORIDE 30 ML/HR: 600; 310; 30; 20 INJECTION, SOLUTION INTRAVENOUS at 10:37

## 2019-09-25 RX ADMIN — LIDOCAINE HYDROCHLORIDE 40 MG: 20 INJECTION, SOLUTION INFILTRATION; PERINEURAL at 10:50

## 2019-09-25 RX ADMIN — PROPOFOL 140 MCG/KG/MIN: 10 INJECTION, EMULSION INTRAVENOUS at 10:50

## 2019-09-25 NOTE — ANESTHESIA PREPROCEDURE EVALUATION
Anesthesia Evaluation     Patient summary reviewed   no history of anesthetic complications:  NPO Solid Status: > 8 hours  NPO Liquid Status: > 2 hours           Airway   Mallampati: II  Dental      Pulmonary    Cardiovascular - normal exam    (+) hypertension,       Neuro/Psych  GI/Hepatic/Renal/Endo    (+) obesity,  hiatal hernia, GERD,      Musculoskeletal     (+) chronic pain,   Abdominal    Substance History      OB/GYN          Other                        Anesthesia Plan    ASA 2     MAC   total IV anesthesia  intravenous induction   Anesthetic plan, all risks, benefits, and alternatives have been provided, discussed and informed consent has been obtained with: patient.

## 2019-09-25 NOTE — ANESTHESIA POSTPROCEDURE EVALUATION
"Patient: Keerthi Graff    Procedure Summary     Date:  09/25/19 Room / Location:   [facility]    Anesthesia Start:  1046 Anesthesia Stop:  1110    Procedure:  ESOPHAGOGASTRODUODENOSCOPY with biopsies (N/A Esophagus) Diagnosis:       Right upper quadrant abdominal pain      (Right upper quadrant abdominal pain [R10.11])    Surgeon:  Munira Angela MD Provider:  Obey Stovall MD    Anesthesia Type:  MAC ASA Status:  2          Anesthesia Type: MAC  Last vitals  BP   132/89 (09/25/19 1025)   Temp       Pulse   65 (09/25/19 1025)   Resp   12 (09/25/19 1025)     SpO2   100 % (09/25/19 1025)     Post Anesthesia Care and Evaluation    Patient location during evaluation: bedside  Patient participation: complete - patient participated  Level of consciousness: awake and alert  Pain management: adequate  Anesthetic complications: No anesthetic complications    Cardiovascular status: acceptable  Respiratory status: acceptable  Hydration status: acceptable    Comments: /89 (BP Location: Left arm, Patient Position: Lying)   Pulse 65   Resp 12   Ht 170.2 cm (67\")   Wt 98.9 kg (218 lb 1.6 oz)   SpO2 100%   BMI 34.16 kg/m²         "

## 2019-09-26 LAB
LAB AP CASE REPORT: NORMAL
PATH REPORT.FINAL DX SPEC: NORMAL
PATH REPORT.GROSS SPEC: NORMAL

## 2019-10-01 LAB — HBA1 GENE MUT TESTED BLD/T: NORMAL

## 2019-10-02 ENCOUNTER — TELEPHONE (OUTPATIENT)
Dept: ONCOLOGY | Facility: HOSPITAL | Age: 39
End: 2019-10-02

## 2019-10-02 NOTE — TELEPHONE ENCOUNTER
----- Message from Jaja Shen MD sent at 10/2/2019  8:09 AM EDT -----  Please inform the patient that the lab showed that she is a carrier of Alpha Thalassemia (condition was discussed during the visit). No vitamin deficiency at this point.    Thank you    Called and informed pt. Pt v/u

## 2020-01-16 ENCOUNTER — TRANSCRIBE ORDERS (OUTPATIENT)
Dept: ADMINISTRATIVE | Facility: HOSPITAL | Age: 40
End: 2020-01-16

## 2020-01-16 DIAGNOSIS — Z12.39 SCREENING BREAST EXAMINATION: Primary | ICD-10-CM

## 2020-01-22 ENCOUNTER — APPOINTMENT (OUTPATIENT)
Dept: MAMMOGRAPHY | Facility: HOSPITAL | Age: 40
End: 2020-01-22

## 2020-01-23 ENCOUNTER — APPOINTMENT (OUTPATIENT)
Dept: WOMENS IMAGING | Facility: HOSPITAL | Age: 40
End: 2020-01-23

## 2020-01-23 PROCEDURE — 77067 SCR MAMMO BI INCL CAD: CPT | Performed by: RADIOLOGY

## 2020-01-23 PROCEDURE — 77063 BREAST TOMOSYNTHESIS BI: CPT | Performed by: RADIOLOGY

## 2020-03-26 ENCOUNTER — APPOINTMENT (OUTPATIENT)
Dept: LAB | Facility: HOSPITAL | Age: 40
End: 2020-03-26

## 2020-05-14 ENCOUNTER — APPOINTMENT (OUTPATIENT)
Dept: LAB | Facility: HOSPITAL | Age: 40
End: 2020-05-14

## 2020-07-01 ENCOUNTER — TELEPHONE (OUTPATIENT)
Dept: ONCOLOGY | Facility: CLINIC | Age: 40
End: 2020-07-01

## 2020-07-01 NOTE — TELEPHONE ENCOUNTER
----- Message from Renate Jones MA sent at 7/1/2020  3:00 PM EDT -----  Zahida,    Can you please cancel her appointment tomorrow and call her when you can to reschedule? She started a new job and can not get off.    thanks

## 2020-07-02 ENCOUNTER — APPOINTMENT (OUTPATIENT)
Dept: LAB | Facility: HOSPITAL | Age: 40
End: 2020-07-02

## 2020-07-23 ENCOUNTER — APPOINTMENT (OUTPATIENT)
Dept: LAB | Facility: HOSPITAL | Age: 40
End: 2020-07-23

## 2020-07-23 ENCOUNTER — TELEPHONE (OUTPATIENT)
Dept: ONCOLOGY | Facility: CLINIC | Age: 40
End: 2020-07-23

## 2020-07-29 ENCOUNTER — OFFICE VISIT (OUTPATIENT)
Dept: ONCOLOGY | Facility: CLINIC | Age: 40
End: 2020-07-29

## 2020-07-29 ENCOUNTER — LAB (OUTPATIENT)
Dept: OTHER | Facility: HOSPITAL | Age: 40
End: 2020-07-29

## 2020-07-29 VITALS
BODY MASS INDEX: 36.13 KG/M2 | DIASTOLIC BLOOD PRESSURE: 68 MMHG | WEIGHT: 230.2 LBS | HEART RATE: 72 BPM | OXYGEN SATURATION: 100 % | HEIGHT: 67 IN | TEMPERATURE: 99 F | RESPIRATION RATE: 18 BRPM | SYSTOLIC BLOOD PRESSURE: 101 MMHG

## 2020-07-29 DIAGNOSIS — R79.89 POSITIVE D DIMER: ICD-10-CM

## 2020-07-29 DIAGNOSIS — R10.10 PAIN OF UPPER ABDOMEN: ICD-10-CM

## 2020-07-29 DIAGNOSIS — R53.82 CHRONIC FATIGUE: ICD-10-CM

## 2020-07-29 DIAGNOSIS — D50.9 MICROCYTIC ANEMIA: ICD-10-CM

## 2020-07-29 DIAGNOSIS — D56.3 ALPHA THALASSAEMIA MINOR: Primary | ICD-10-CM

## 2020-07-29 LAB
BASOPHILS # BLD AUTO: 0.05 10*3/MM3 (ref 0–0.2)
BASOPHILS NFR BLD AUTO: 1 % (ref 0–1.5)
DEPRECATED RDW RBC AUTO: 38.3 FL (ref 37–54)
EOSINOPHIL # BLD AUTO: 0.12 10*3/MM3 (ref 0–0.4)
EOSINOPHIL NFR BLD AUTO: 2.4 % (ref 0.3–6.2)
ERYTHROCYTE [DISTWIDTH] IN BLOOD BY AUTOMATED COUNT: 14.9 % (ref 12.3–15.4)
HCT VFR BLD AUTO: 39.3 % (ref 34–46.6)
HGB BLD-MCNC: 12.4 G/DL (ref 12–15.9)
HYPOCHROMIA BLD QL: NORMAL
IMM GRANULOCYTES # BLD AUTO: 0.03 10*3/MM3 (ref 0–0.05)
IMM GRANULOCYTES NFR BLD AUTO: 0.6 % (ref 0–0.5)
LYMPHOCYTES # BLD AUTO: 1.21 10*3/MM3 (ref 0.7–3.1)
LYMPHOCYTES NFR BLD AUTO: 24.2 % (ref 19.6–45.3)
MCH RBC QN AUTO: 22.9 PG (ref 26.6–33)
MCHC RBC AUTO-ENTMCNC: 31.6 G/DL (ref 31.5–35.7)
MCV RBC AUTO: 72.5 FL (ref 79–97)
MONOCYTES # BLD AUTO: 0.56 10*3/MM3 (ref 0.1–0.9)
MONOCYTES NFR BLD AUTO: 11.2 % (ref 5–12)
NEUTROPHILS NFR BLD AUTO: 3.02 10*3/MM3 (ref 1.7–7)
NEUTROPHILS NFR BLD AUTO: 60.6 % (ref 42.7–76)
NRBC BLD AUTO-RTO: 1.6 /100 WBC (ref 0–0.2)
PLAT MORPH BLD: NORMAL
PLATELET # BLD AUTO: 297 10*3/MM3 (ref 140–450)
PMV BLD AUTO: 11.3 FL (ref 6–12)
RBC # BLD AUTO: 5.42 10*6/MM3 (ref 3.77–5.28)
WBC # BLD AUTO: 4.99 10*3/MM3 (ref 3.4–10.8)
WBC MORPH BLD: NORMAL

## 2020-07-29 PROCEDURE — 85007 BL SMEAR W/DIFF WBC COUNT: CPT | Performed by: INTERNAL MEDICINE

## 2020-07-29 PROCEDURE — 36415 COLL VENOUS BLD VENIPUNCTURE: CPT

## 2020-07-29 PROCEDURE — 85025 COMPLETE CBC W/AUTO DIFF WBC: CPT | Performed by: INTERNAL MEDICINE

## 2020-07-29 PROCEDURE — 99214 OFFICE O/P EST MOD 30 MIN: CPT | Performed by: INTERNAL MEDICINE

## 2020-07-29 NOTE — PROGRESS NOTES
Subjective     CHIEF COMPLAINT:      Chief Complaint   Patient presents with   • Follow-up     NO CONCERNS       HISTORY OF PRESENT ILLNESS:     Keerthi Nova is a 40 y.o. female patient who returns today for follow up on her anemia.  She returns today for follow-up and reports developing left leg pain and bruising.  She felt that her legs were hot and was concerned about possible blood clots.  She went to [facility].  D-dimer was positive.  Therefore, she had a venous Doppler of both lower extremities that was negative.    Patient denies fatigue.  No blood in the urine or stool.  However, she reports episodes of diarrhea.  She was told that she possibly has ulcerative colitis about 10 years ago but she is uncertain about the diagnosis.  She is going to have a colonoscopy next week.        REVIEW OF SYSTEMS:  Review of Systems   Constitutional: Negative for fatigue, fever and unexpected weight change.   HENT: Negative for nosebleeds and voice change.    Eyes: Negative for visual disturbance.   Respiratory: Negative for cough and shortness of breath.    Cardiovascular: Negative for chest pain and leg swelling.   Gastrointestinal: Negative for abdominal pain, blood in stool, constipation, diarrhea, nausea and vomiting.   Genitourinary: Negative for frequency and hematuria.   Musculoskeletal: Negative for back pain and joint swelling.   Skin: Negative for rash.   Neurological: Negative for dizziness and headaches.   Hematological: Negative for adenopathy. Bruises/bleeds easily.   Psychiatric/Behavioral: Negative for dysphoric mood. The patient is not nervous/anxious.      I reviewed and verified the CC and ROS obtained by the MA.     Past Medical History:   Diagnosis Date   • Anemia    • Arthritis    • Cubital tunnel syndrome     Left   • Delayed gastric emptying    • GERD (gastroesophageal reflux disease)    • Hiatal hernia    • Hypertension    • Seasonal allergies        Past Surgical History:   Procedure  "Laterality Date   • BILATERAL BREAST REDUCTION     •  SECTION      x2   • CHOLECYSTECTOMY  2019   • COLONOSCOPY  2013    normal   • CYST REMOVAL Right     x2   • DILATATION AND CURETTAGE     • ENDOMETRIAL ABLATION     • ENDOSCOPY N/A 2019    Procedure: ESOPHAGOGASTRODUODENOSCOPY with biopsies;  Surgeon: Munira Angela MD;  Location: [facility];  Service: Gastroenterology   • GANGLION CYST EXCISION Right    • HYSTERECTOMY     • UPPER GASTROINTESTINAL ENDOSCOPY      gerd       Cancer-related family history is not on file.  Social History     Tobacco Use   • Smoking status: Former Smoker   • Smokeless tobacco: Never Used   Substance Use Topics   • Alcohol use: Yes     Comment: \"socially\" \"once a month\"       MEDICATIONS:    Current Outpatient Medications:   •  diclofenac (VOLTAREN) 1 % gel gel, Place 2 g on the skin as directed by provider., Disp: , Rfl:   •  HYDROcodone-acetaminophen (NORCO) 7.5-325 MG per tablet, Take  by mouth As Needed., Disp: , Rfl: 0  •  lisinopril-hydrochlorothiazide (PRINZIDE,ZESTORETIC) 20-12.5 MG per tablet, lisinopril 20 mg-hydrochlorothiazide 12.5 mg tablet  Take 1 tablet every day by oral route., Disp: , Rfl:   •  methocarbamol (ROBAXIN) 500 MG tablet, Take 500 mg by mouth As Needed., Disp: , Rfl:   •  metoprolol tartrate (LOPRESSOR) 25 MG tablet, Take 25 mg by mouth Daily., Disp: , Rfl:   •  pantoprazole (PROTONIX) 40 MG EC tablet, Take 40 mg by mouth 2 (Two) Times a Day., Disp: , Rfl:   •  flavoxATE (URISPAS) 100 MG tablet, Take 1 tablet by mouth 3 (Three) Times a Day As Needed for bladder spasms., Disp: 12 tablet, Rfl: 0  •  hyoscyamine (ANASPAZ,LEVSIN) 0.125 MG tablet, Take 1 tablet by mouth Every 6 (Six) Hours As Needed for Cramping (abdominal pain)., Disp: 90 tablet, Rfl: 1    ALLERGIES:  Allergies   Allergen Reactions   • Ibuprofen GI Intolerance   • Sulfamethoxazole-Trimethoprim Hives         Objective   VITAL SIGNS:     Vitals:    20 1250 " "  BP: 101/68   Pulse: 72   Resp: 18   Temp: 99 °F (37.2 °C)   TempSrc: Oral   SpO2: 100%   Weight: 104 kg (230 lb 3.2 oz)   Height: 170.2 cm (67.01\")   PainSc: 0-No pain     Body mass index is 36.05 kg/m².     Wt Readings from Last 3 Encounters:   07/29/20 104 kg (230 lb 3.2 oz)   09/25/19 98.9 kg (218 lb 1.6 oz)   09/19/19 99.4 kg (219 lb 3.2 oz)       PHYSICAL EXAMINATION:  GENERAL:  The patient appears in good general condition, not in acute distress.  SKIN: No skin rashes.  Old ecchymosis over left thigh ecchymosis.  HEAD:  Normocephalic.  EYES:  No Jaundice. No Pallor. Pupils equal. EOMI.  NECK:  Supple with Good ROM.   CHEST: Normal respiratory effort.    CARDIAC:  No edema.  ABDOMEN: Nondistended  EXTREMITIES: Changes of resolved ecchymosis over the left thigh.  No Calf tenderness.  No size difference between the right and left legs.  NEUROLOGICAL:  No Focal neurological deficits.       DIAGNOSTIC DATA:     Results from last 7 days   Lab Units 07/29/20  1239   WBC 10*3/mm3 4.99   NEUTROS ABS 10*3/mm3 3.02   HEMOGLOBIN g/dL 12.4   HEMATOCRIT % 39.3   PLATELETS 10*3/mm3 297     Component      Latest Ref Rng & Units 8/5/2019 9/19/2019   Iron      37 - 145 mcg/dL  69   Iron Saturation      14 - 48 %  24   Transferrin      200 - 360 mg/dL  207   TIBC      249 - 505 mcg/dL  290   Methylmalonic Acid      0 - 378 nmol/L  221   DISCLAIMER        Comment   Ferritin      13.00 - 150.00 ng/mL 77.90    Vitamin B-12      211 - 946 pg/mL  598   Folate      4.78 - 24.20 ng/mL  14.60       Component      Latest Ref Rng & Units 9/19/2019   Hemoglobin (Hgb) Solubility      Negative Negative   Hgb F Quant      0.0 - 2.0 % 0.0   Hemoglobin A      96.4 - 98.8 % 97.8   Hgb S      0.0 % 0.0   Hgb C      0.0 % 0.0   Hgb A2 Quant      1.8 - 3.2 % 2.2   Hgb Interp.     Comment     Alpha thalassemia test on 9/19/2019:  Comment: Test: Alpha-Thalassemia, DNA Analysis   Result:        Alpha-thalassemia minor, alpha-/alpha-   Mutation(s) " identified:  alpha3.7 and alpha3.7     EGD on 9/25/2019:  - Z-line regular, 40 cm from the incisors.  - Small sliding hiatal hernia.  - Erythematous mucosa in the gastric body and antrum. Biopsied.  - Normal examined duodenum. Biopsied.      Assessment/Plan   1.  Alpha thalassemia minor. Patient has microcytic hypochromic anemia.  Alpha thalassemia gene test on 9/19/2019 showed her to be alpha-/alpha-.      I explained the condition to the patient.  I explained that this would result in chronically small RBCs.  Based on her genetic composition, her children are automatic carriers of alpha -.  They may have more severe condition if their father is a silent carrier.    Patient had anemia earlier in the year with lowest hemoglobin of 10.2 on 2/2/2020.  The exact etiology is not clear but her anemia has normalized with hemoglobin at 12.4 today.  She is not having fatigue.    2.  Positive D-dimer test.  Patient was having pain and bruising of the left lower extremity.  She went to the ER at [facility] on 7/28/2020.  Lab test showed positive d-dimer.  She had venous Doppler of both lower extremities that was negative.  Patient states that the pain and bruising have improved.  No chest pain.  No shortness of breath.    Patient thinks that she was exposed to the coronavirus from her brother who is a  who tested positive in late March 2020.  She was not able to have the test done.  She had fever and abdominal symptoms.  She is going to have the COVID-19 antibody test done once it becomes available.    3.  Possible ulcerative colitis.  Patient reports abdominal pain.  She also reports episodes of diarrhea with a bad odor to the stool.  She is going to have a colonoscopy next week.    PLAN:    1.  Patient does not need iron, folate or vitamin B-12 supplementation at this point.    2.  Due to her possible underlying ulcerative colitis, she is at risk of developing iron deficiency in the future.  We will  therefore monitor her with at least annual labs (CBC and iron studies).  She will notify us if she has fatigue indicative of development of iron deficiency and we would bring her sooner for lab evaluation.    3.  We discussed the genetics of alpha thalassemia.  She already informed her children's pediatrician of this diagnosis.    We will see the patient in follow-up in 1 year with CBC ferritin iron panel.    Jaja Shen MD  07/29/20

## 2021-04-02 ENCOUNTER — BULK ORDERING (OUTPATIENT)
Dept: CASE MANAGEMENT | Facility: OTHER | Age: 41
End: 2021-04-02

## 2021-04-02 DIAGNOSIS — Z23 IMMUNIZATION DUE: ICD-10-CM

## 2021-05-03 NOTE — TELEPHONE ENCOUNTER
Received [facility] paperwork for patient.  Completed and left on Dr Julio César Ashley for signature.     
Received call from [facility] asking two questions: what is the length of time for paperwork to be active? And patient has an unaccounted for absence on 04/16 and wants to know if Dr Angela would cover that on this paperwork.     Returned call to [place] and provided information that this was only good for the days that Dr Angela signed off on which were 04/08,04/12, 04/15.  She will not write for 04/16 because patient was seen at the [facility], it was not for a test that she ordered or for an appointment with her.     
Received note from Dr Angela stating she will not give extended intermittent leave for patient.  Stated she will write letter for three days requested.     Spoke with patient and states jobs doesn't accept letters for missed days.  Will need the [facility].    Changed forms for Dr angela to sign being specifice for certain days.  She has signed.     Contacted patient back by phone to let her know they had been signed and would be faxed to [facility].    
03-May-2021 19:30

## 2021-11-19 ENCOUNTER — OFFICE VISIT (OUTPATIENT)
Dept: FAMILY MEDICINE CLINIC | Facility: CLINIC | Age: 41
End: 2021-11-19

## 2021-11-19 VITALS
SYSTOLIC BLOOD PRESSURE: 110 MMHG | TEMPERATURE: 98.2 F | DIASTOLIC BLOOD PRESSURE: 72 MMHG | OXYGEN SATURATION: 100 % | BODY MASS INDEX: 34.89 KG/M2 | HEART RATE: 61 BPM | WEIGHT: 222.8 LBS

## 2021-11-19 DIAGNOSIS — D50.9 MICROCYTIC ANEMIA: ICD-10-CM

## 2021-11-19 DIAGNOSIS — K21.9 GASTROESOPHAGEAL REFLUX DISEASE, UNSPECIFIED WHETHER ESOPHAGITIS PRESENT: ICD-10-CM

## 2021-11-19 DIAGNOSIS — R60.0 EDEMA OF LEFT LOWER EXTREMITY: ICD-10-CM

## 2021-11-19 DIAGNOSIS — E55.9 VITAMIN D DEFICIENCY: ICD-10-CM

## 2021-11-19 DIAGNOSIS — I10 ESSENTIAL HYPERTENSION: ICD-10-CM

## 2021-11-19 DIAGNOSIS — M25.562 ACUTE PAIN OF LEFT KNEE: Primary | ICD-10-CM

## 2021-11-19 PROBLEM — Z12.11 COLON CANCER SCREENING: Status: ACTIVE | Noted: 2020-10-26

## 2021-11-19 PROBLEM — M47.812 CERVICAL SPONDYLOSIS WITHOUT MYELOPATHY: Status: ACTIVE | Noted: 2018-11-08

## 2021-11-19 PROBLEM — M54.16 LUMBAR NERVE ROOT COMPRESSION: Status: ACTIVE | Noted: 2018-11-01

## 2021-11-19 PROBLEM — Z82.49 FAMILY HISTORY OF PREMATURE CAD: Status: ACTIVE | Noted: 2019-04-04

## 2021-11-19 PROBLEM — L73.2 HIDRADENITIS AXILLARIS: Status: ACTIVE | Noted: 2018-01-08

## 2021-11-19 PROBLEM — M50.30 DDD (DEGENERATIVE DISC DISEASE), CERVICAL: Status: ACTIVE | Noted: 2019-06-17

## 2021-11-19 PROBLEM — M51.37 DDD (DEGENERATIVE DISC DISEASE), LUMBOSACRAL: Status: ACTIVE | Noted: 2018-09-20

## 2021-11-19 PROBLEM — M47.816 LUMBAR SPONDYLOSIS: Status: ACTIVE | Noted: 2019-07-22

## 2021-11-19 PROCEDURE — 99203 OFFICE O/P NEW LOW 30 MIN: CPT | Performed by: NURSE PRACTITIONER

## 2021-11-19 RX ORDER — LATANOPROST 50 UG/ML
1 SOLUTION/ DROPS OPHTHALMIC DAILY
COMMUNITY
Start: 2021-06-04

## 2021-11-19 RX ORDER — LISINOPRIL AND HYDROCHLOROTHIAZIDE 25; 20 MG/1; MG/1
1 TABLET ORAL DAILY
COMMUNITY
Start: 2021-10-13 | End: 2022-03-31 | Stop reason: SDUPTHER

## 2021-11-19 RX ORDER — NORTRIPTYLINE HYDROCHLORIDE 25 MG/1
25 CAPSULE ORAL AS NEEDED
COMMUNITY
Start: 2021-06-04 | End: 2022-03-25

## 2021-11-19 RX ORDER — PANTOPRAZOLE SODIUM 40 MG/1
40 TABLET, DELAYED RELEASE ORAL DAILY
Qty: 90 TABLET | Refills: 1 | Status: SHIPPED | OUTPATIENT
Start: 2021-11-19 | End: 2022-04-14 | Stop reason: SDUPTHER

## 2021-11-19 NOTE — PROGRESS NOTES
Chief Complaint  Pain (pain left side, knee, leg for about month, no known injury)  This is my first time seeing this patient.   Subjective          Keerthi Nova presents to [facility]  Knee Pain   There was no injury mechanism. The pain is present in the left knee. The quality of the pain is described as aching. The pain is moderate. The pain has been intermittent since onset. Pertinent negatives include no inability to bear weight, muscle weakness or numbness. The symptoms are aggravated by movement and palpation. She has tried ice and NSAIDs for the symptoms. The treatment provided mild relief.     Objective   Vital Signs:   /72 (BP Location: Right arm, Patient Position: Sitting, Cuff Size: Large Adult)   Pulse 61   Temp 98.2 °F (36.8 °C) (Temporal)   Wt 101 kg (222 lb 12.8 oz)   SpO2 100%   BMI 34.89 kg/m²     Physical Exam  Vitals and nursing note reviewed.   Cardiovascular:      Rate and Rhythm: Normal rate and regular rhythm.      Heart sounds: Normal heart sounds.   Pulmonary:      Effort: Pulmonary effort is normal.      Breath sounds: Normal breath sounds.   Musculoskeletal:         General: Tenderness: 1+ pitting without erythema or calf tenderness       Left lower leg: Edema present.   Neurological:      Mental Status: She is oriented to person, place, and time.   Psychiatric:         Mood and Affect: Mood normal.        Result Review :            Assessment and Plan    Diagnoses and all orders for this visit:    1. Acute pain of left knee (Primary)  -     XR Knee 1 or 2 View Left; Future    2. Edema of left lower extremity  Comments:  - Patient states she had a negative venous ultrasound of bilateral lower extremities at an outside facility.   Orders:  -     Compression Knee High Stockings    3. Essential hypertension  -     Comprehensive Metabolic Panel  -     Lipid Panel With / Chol / HDL Ratio    4. Vitamin D deficiency  -     Vitamin D 25 Hydroxy    5.  Gastroesophageal reflux disease, unspecified whether esophagitis present  -     Magnesium  -     pantoprazole (PROTONIX) 40 MG EC tablet; Take 1 tablet by mouth Daily.  Dispense: 90 tablet; Refill: 1    6. Microcytic anemia  -     CBC & Differential  -     Iron and TIBC  -     Ferritin      I spent 33 minutes caring for Keerthi on this date of service. This time includes time spent by me in the following activities:performing a medically appropriate examination and/or evaluation , counseling and educating the patient/family/caregiver, ordering medications, tests, or procedures and documenting information in the medical record  Follow Up   Return in about 4 weeks (around 12/17/2021) for Recheck.  Patient was given instructions and counseling regarding her condition or for health maintenance advice. Please see specific information pulled into the AVS if appropriate.

## 2021-11-20 LAB
25(OH)D3+25(OH)D2 SERPL-MCNC: 16.5 NG/ML (ref 30–100)
ALBUMIN SERPL-MCNC: 4.1 G/DL (ref 3.8–4.8)
ALBUMIN/GLOB SERPL: 1.5 {RATIO} (ref 1.2–2.2)
ALP SERPL-CCNC: 69 IU/L (ref 44–121)
ALT SERPL-CCNC: 10 IU/L (ref 0–32)
AST SERPL-CCNC: 15 IU/L (ref 0–40)
BASOPHILS # BLD AUTO: 0 X10E3/UL (ref 0–0.2)
BASOPHILS NFR BLD AUTO: 1 %
BILIRUB SERPL-MCNC: 0.2 MG/DL (ref 0–1.2)
BUN SERPL-MCNC: 13 MG/DL (ref 6–24)
BUN/CREAT SERPL: 18 (ref 9–23)
CALCIUM SERPL-MCNC: 9.5 MG/DL (ref 8.7–10.2)
CHLORIDE SERPL-SCNC: 102 MMOL/L (ref 96–106)
CHOLEST SERPL-MCNC: 192 MG/DL (ref 100–199)
CHOLEST/HDLC SERPL: 3.1 RATIO (ref 0–4.4)
CO2 SERPL-SCNC: 23 MMOL/L (ref 20–29)
CREAT SERPL-MCNC: 0.73 MG/DL (ref 0.57–1)
EOSINOPHIL # BLD AUTO: 0.1 X10E3/UL (ref 0–0.4)
EOSINOPHIL NFR BLD AUTO: 1 %
ERYTHROCYTE [DISTWIDTH] IN BLOOD BY AUTOMATED COUNT: 14.4 % (ref 11.7–15.4)
FERRITIN SERPL-MCNC: 88 NG/ML (ref 15–150)
GLOBULIN SER CALC-MCNC: 2.8 G/DL (ref 1.5–4.5)
GLUCOSE SERPL-MCNC: NORMAL MG/DL
HCT VFR BLD AUTO: 37.6 % (ref 34–46.6)
HDLC SERPL-MCNC: 61 MG/DL
HGB BLD-MCNC: 11.1 G/DL (ref 11.1–15.9)
IMM GRANULOCYTES # BLD AUTO: 0 X10E3/UL (ref 0–0.1)
IMM GRANULOCYTES NFR BLD AUTO: 0 %
IRON SATN MFR SERPL: 32 % (ref 15–55)
IRON SERPL-MCNC: 76 UG/DL (ref 27–159)
LDLC SERPL CALC-MCNC: 120 MG/DL (ref 0–99)
LYMPHOCYTES # BLD AUTO: 1.5 X10E3/UL (ref 0.7–3.1)
LYMPHOCYTES NFR BLD AUTO: 25 %
MAGNESIUM SERPL-MCNC: 2.1 MG/DL (ref 1.6–2.3)
MCH RBC QN AUTO: 22 PG (ref 26.6–33)
MCHC RBC AUTO-ENTMCNC: 29.5 G/DL (ref 31.5–35.7)
MCV RBC AUTO: 75 FL (ref 79–97)
MONOCYTES # BLD AUTO: 0.5 X10E3/UL (ref 0.1–0.9)
MONOCYTES NFR BLD AUTO: 9 %
NEUTROPHILS # BLD AUTO: 3.8 X10E3/UL (ref 1.4–7)
NEUTROPHILS NFR BLD AUTO: 64 %
PLATELET # BLD AUTO: 390 X10E3/UL (ref 150–450)
POTASSIUM SERPL-SCNC: NORMAL MMOL/L
PROT SERPL-MCNC: 6.9 G/DL (ref 6–8.5)
RBC # BLD AUTO: 5.05 X10E6/UL (ref 3.77–5.28)
SODIUM SERPL-SCNC: 139 MMOL/L (ref 134–144)
TIBC SERPL-MCNC: 239 UG/DL (ref 250–450)
TRIGL SERPL-MCNC: 59 MG/DL (ref 0–149)
UIBC SERPL-MCNC: 163 UG/DL (ref 131–425)
VLDLC SERPL CALC-MCNC: 11 MG/DL (ref 5–40)
WBC # BLD AUTO: 6 X10E3/UL (ref 3.4–10.8)

## 2021-11-23 ENCOUNTER — TELEPHONE (OUTPATIENT)
Dept: FAMILY MEDICINE CLINIC | Facility: CLINIC | Age: 41
End: 2021-11-23

## 2021-11-23 RX ORDER — ERGOCALCIFEROL 1.25 MG/1
50000 CAPSULE ORAL WEEKLY
Qty: 12 CAPSULE | Refills: 0 | Status: SHIPPED | OUTPATIENT
Start: 2021-11-23 | End: 2022-03-25 | Stop reason: SDUPTHER

## 2021-11-23 RX ORDER — MELOXICAM 15 MG/1
15 TABLET ORAL DAILY PRN
Qty: 90 TABLET | Refills: 0 | Status: SHIPPED | OUTPATIENT
Start: 2021-11-23

## 2021-11-23 NOTE — TELEPHONE ENCOUNTER
Blood sugar was not performed.  Kidney function tests are normal.  Potassium was not performed.  Liver function tests are normal.  LDL has increased so will need to work on diet.  Vitamin D is deficient so will need to start vitamin D2 50,000 units weekly.  Magnesium is normal.  CBC does not show any anemia.  Iron studies are normal.    Patient aware of results and recommendations.

## 2021-12-01 ENCOUNTER — PATIENT ROUNDING (BHMG ONLY) (OUTPATIENT)
Dept: FAMILY MEDICINE CLINIC | Facility: CLINIC | Age: 41
End: 2021-12-01

## 2021-12-09 ENCOUNTER — TELEMEDICINE (OUTPATIENT)
Dept: FAMILY MEDICINE CLINIC | Facility: CLINIC | Age: 41
End: 2021-12-09

## 2021-12-09 DIAGNOSIS — R10.32 LEFT LOWER QUADRANT ABDOMINAL PAIN: Primary | ICD-10-CM

## 2021-12-09 PROCEDURE — 99213 OFFICE O/P EST LOW 20 MIN: CPT | Performed by: NURSE PRACTITIONER

## 2021-12-09 RX ORDER — PREGABALIN 75 MG/1
CAPSULE ORAL
COMMUNITY
Start: 2021-12-07 | End: 2022-03-25

## 2021-12-09 NOTE — PROGRESS NOTES
Chief Complaint  Abdominal Pain  You have chosen to receive care through a telehealth visit.  Do you consent to use a video/audio connection for your medical care today? Yes via Haiku   Subjective          Keerthi Nova presents to [facility]  Abdominal Pain  This is a new problem. The current episode started more than 1 month ago. The problem occurs intermittently. The pain is located in the LLQ and LUQ. The quality of the pain is dull. The abdominal pain radiates to the back. Associated symptoms include belching and constipation. Pertinent negatives include no dysuria, fever, hematochezia, hematuria, nausea or vomiting. Nothing aggravates the pain. The pain is relieved by eating.     Objective   Vital Signs:   There were no vitals taken for this visit.    Physical Exam  Constitutional:       Appearance: Normal appearance.   Neurological:      Mental Status: She is alert.   Psychiatric:         Mood and Affect: Mood normal.        Result Review :   The following data was reviewed by: PRIMITIVO Dozier on 12/09/2021:  CMP    CMP 11/19/21   Glucose CANCELED   BUN 13   Creatinine 0.73   eGFR Non  Am 102   eGFR African Am 117   Sodium 139   Potassium CANCELED   Chloride 102   Calcium 9.5   Total Protein 6.9   Albumin 4.1   Globulin 2.8   Total Bilirubin 0.2   Alkaline Phosphatase 69   AST (SGOT) 15   ALT (SGPT) 10      Comments are available for some flowsheets but are not being displayed.           CBC w/diff    CBC w/Diff 11/19/21   WBC 6.0   RBC 5.05   Hemoglobin 11.1   Hematocrit 37.6   MCV 75 (A)   MCH 22.0 (A)   MCHC 29.5 (A)   RDW 14.4   Platelets 390   Neutrophil Rel % 64   Lymphocyte Rel % 25   Monocyte Rel % 9   Eosinophil Rel % 1   Basophil Rel % 1   (A) Abnormal value            Data reviewed: GI studies colonoscopy on 2/12/2021.           Assessment and Plan    Diagnoses and all orders for this visit:    1. Left lower quadrant abdominal pain  (Primary)  Comments:  - Will order CT abdomen and pelvis pending lab results.   Orders:  -     Lipase; Future  -     Amylase; Future  -     Urinalysis With Culture If Indicated - Urine, Clean Catch; Future      I spent 20 minutes caring for Keerthi on this date of service. This time includes time spent by me in the following activities:reviewing tests, counseling and educating the patient/family/caregiver, ordering medications, tests, or procedures and documenting information in the medical record  Follow Up   Return if symptoms worsen or fail to improve.  Patient was given instructions and counseling regarding her condition or for health maintenance advice. Please see specific information pulled into the AVS if appropriate.

## 2022-02-01 NOTE — TELEPHONE ENCOUNTER
Caller: Keerthi Nova    Relationship: Self    Best call back number:     Requested Prescriptions:   Requested Prescriptions     Pending Prescriptions Disp Refills   • metoprolol tartrate (LOPRESSOR) 25 MG tablet       Sig: Take 1 tablet by mouth Daily.        Pharmacy where request should be sent:  [facility]  [address] [place] [address]  [phone]    Additional details provided by patient: PATIENT IS OUT OF THIS MEDICATION.  SHE SAYS IF SHE NEEDS TO COME IN FOR AN APPOINTMENT SHE WILL.  SHE WAS SEEN AS A NEW PATIENT IN NOVEMBER    Does the patient have less than a 3 day supply:  [x] Yes  [] No    Amarjit Conroy   02/01/22 11:02 EST

## 2022-03-11 ENCOUNTER — LAB (OUTPATIENT)
Dept: LAB | Facility: HOSPITAL | Age: 42
End: 2022-03-11

## 2022-03-11 ENCOUNTER — TRANSCRIBE ORDERS (OUTPATIENT)
Dept: ADMINISTRATIVE | Facility: HOSPITAL | Age: 42
End: 2022-03-11

## 2022-03-11 DIAGNOSIS — R10.32 ABDOMINAL PAIN, LEFT LOWER QUADRANT: Primary | ICD-10-CM

## 2022-03-11 DIAGNOSIS — R10.32 ABDOMINAL PAIN, LEFT LOWER QUADRANT: ICD-10-CM

## 2022-03-11 LAB
AMYLASE SERPL-CCNC: 30 U/L (ref 28–100)
BILIRUB UR QL STRIP: NEGATIVE
CLARITY UR: CLEAR
COLOR UR: YELLOW
GLUCOSE UR STRIP-MCNC: NEGATIVE MG/DL
HGB UR QL STRIP.AUTO: NEGATIVE
KETONES UR QL STRIP: NEGATIVE
LEUKOCYTE ESTERASE UR QL STRIP.AUTO: NEGATIVE
LIPASE SERPL-CCNC: 12 U/L (ref 13–60)
NITRITE UR QL STRIP: NEGATIVE
PH UR STRIP.AUTO: 5.5 [PH] (ref 5–8)
PROT UR QL STRIP: NEGATIVE
SP GR UR STRIP: 1.02 (ref 1–1.03)
UROBILINOGEN UR QL STRIP: NORMAL

## 2022-03-11 PROCEDURE — 81003 URINALYSIS AUTO W/O SCOPE: CPT

## 2022-03-11 PROCEDURE — 36415 COLL VENOUS BLD VENIPUNCTURE: CPT

## 2022-03-11 PROCEDURE — 82150 ASSAY OF AMYLASE: CPT

## 2022-03-11 PROCEDURE — 83690 ASSAY OF LIPASE: CPT

## 2022-03-25 ENCOUNTER — TELEMEDICINE (OUTPATIENT)
Dept: FAMILY MEDICINE CLINIC | Facility: CLINIC | Age: 42
End: 2022-03-25

## 2022-03-25 DIAGNOSIS — E55.9 VITAMIN D DEFICIENCY: ICD-10-CM

## 2022-03-25 DIAGNOSIS — J01.90 ACUTE SINUSITIS, RECURRENCE NOT SPECIFIED, UNSPECIFIED LOCATION: Primary | ICD-10-CM

## 2022-03-25 PROBLEM — M79.7 FIBROMYALGIA: Status: ACTIVE | Noted: 2022-01-25

## 2022-03-25 PROCEDURE — 99212 OFFICE O/P EST SF 10 MIN: CPT | Performed by: NURSE PRACTITIONER

## 2022-03-25 RX ORDER — ERGOCALCIFEROL 1.25 MG/1
50000 CAPSULE ORAL WEEKLY
Qty: 12 CAPSULE | Refills: 0 | Status: SHIPPED | OUTPATIENT
Start: 2022-03-25 | End: 2022-04-26 | Stop reason: SDUPTHER

## 2022-03-25 RX ORDER — AMOXICILLIN AND CLAVULANATE POTASSIUM 875; 125 MG/1; MG/1
1 TABLET, FILM COATED ORAL 2 TIMES DAILY
Qty: 20 TABLET | Refills: 0 | Status: SHIPPED | OUTPATIENT
Start: 2022-03-25 | End: 2022-04-04

## 2022-03-25 NOTE — PROGRESS NOTES
Chief Complaint  Sinus Problem  You have chosen to receive care through a telehealth visit.  Do you consent to use a video/audio connection for your medical care today? Yes via Doximity   Subjective          Keerthi Nova presents to [facility]  Sinusitis  This is a new problem. The current episode started in the past 7 days. There has been no fever. Associated symptoms include congestion, headaches and sinus pressure. Pertinent negatives include no coughing, ear pain or sore throat. Treatments tried: Zyrtec and Mucinex.  The treatment provided mild relief.   Patient reports negative home COVID test on 3/24/2022.   Objective   Vital Signs:   There were no vitals taken for this visit.    BMI has not been calculated during today's encounter.       Physical Exam  Constitutional:       Appearance: Normal appearance.   Neurological:      Mental Status: She is alert.   Psychiatric:         Mood and Affect: Mood normal.        Result Review :            Assessment and Plan    Diagnoses and all orders for this visit:    1. Acute sinusitis, recurrence not specified, unspecified location (Primary)  -     amoxicillin-clavulanate (Augmentin) 875-125 MG per tablet; Take 1 tablet by mouth 2 (Two) Times a Day for 10 days.  Dispense: 20 tablet; Refill: 0    2. Vitamin D deficiency  -     vitamin D (ERGOCALCIFEROL) 1.25 MG (04395 UT) capsule capsule; Take 1 capsule by mouth 1 (One) Time Per Week.  Dispense: 12 capsule; Refill: 0      I spent 15 minutes caring for Keerthi on this date of service. This time includes time spent by me in the following activities:counseling and educating the patient/family/caregiver, ordering medications, tests, or procedures and documenting information in the medical record  Follow Up   Return if symptoms worsen or fail to improve.  Patient was given instructions and counseling regarding her condition or for health maintenance advice. Please see specific information  pulled into the AVS if appropriate.

## 2022-04-01 RX ORDER — LISINOPRIL AND HYDROCHLOROTHIAZIDE 25; 20 MG/1; MG/1
1 TABLET ORAL DAILY
Qty: 30 TABLET | Refills: 0 | Status: SHIPPED | OUTPATIENT
Start: 2022-04-01 | End: 2022-04-26 | Stop reason: SDUPTHER

## 2022-04-14 ENCOUNTER — HOSPITAL ENCOUNTER (OUTPATIENT)
Dept: ULTRASOUND IMAGING | Facility: HOSPITAL | Age: 42
Discharge: HOME | End: 2022-04-14
Admitting: NURSE PRACTITIONER

## 2022-04-14 ENCOUNTER — TELEPHONE (OUTPATIENT)
Dept: FAMILY MEDICINE CLINIC | Facility: CLINIC | Age: 42
End: 2022-04-14

## 2022-04-14 DIAGNOSIS — R10.32 ABDOMINAL PAIN, LEFT LOWER QUADRANT: ICD-10-CM

## 2022-04-14 DIAGNOSIS — K21.9 GASTROESOPHAGEAL REFLUX DISEASE, UNSPECIFIED WHETHER ESOPHAGITIS PRESENT: ICD-10-CM

## 2022-04-14 PROCEDURE — 76700 US EXAM ABDOM COMPLETE: CPT

## 2022-04-14 NOTE — TELEPHONE ENCOUNTER
Caller: Keerthi Nova    Relationship: Self    Best call back number: [phone]    Requested Prescriptions:   Requested Prescriptions     Pending Prescriptions Disp Refills   • pantoprazole (PROTONIX) 40 MG EC tablet 90 tablet 1     Sig: Take 1 tablet by mouth Daily.        Pharmacy where request should be sent: [facility]Y #[id number] - [place], [place] - [address] [place] [address] RD. AT [facility] - [phone] [facility] [phone] FX     Additional details provided by patient: PATIENT NEEDS A PRIOR AUTHORIZATION FOR A 90 DAY SUPPLY AND A REFILL FOR THIS MEDICATION ASAP  Does the patient have less than a 3 day supply:  [x] Yes  [] No    Amarjit Purdy Rep   04/14/22 12:26 EDT

## 2022-04-15 RX ORDER — PANTOPRAZOLE SODIUM 40 MG/1
40 TABLET, DELAYED RELEASE ORAL DAILY
Qty: 15 TABLET | Refills: 0 | Status: SHIPPED | OUTPATIENT
Start: 2022-04-15 | End: 2022-04-26 | Stop reason: SDUPTHER

## 2022-04-26 ENCOUNTER — TELEMEDICINE (OUTPATIENT)
Dept: FAMILY MEDICINE CLINIC | Facility: CLINIC | Age: 42
End: 2022-04-26

## 2022-04-26 DIAGNOSIS — R10.32 LEFT LOWER QUADRANT ABDOMINAL PAIN: ICD-10-CM

## 2022-04-26 DIAGNOSIS — I10 ESSENTIAL HYPERTENSION: Primary | ICD-10-CM

## 2022-04-26 DIAGNOSIS — E55.9 VITAMIN D DEFICIENCY: ICD-10-CM

## 2022-04-26 DIAGNOSIS — K21.9 GASTROESOPHAGEAL REFLUX DISEASE, UNSPECIFIED WHETHER ESOPHAGITIS PRESENT: ICD-10-CM

## 2022-04-26 PROCEDURE — 99213 OFFICE O/P EST LOW 20 MIN: CPT | Performed by: NURSE PRACTITIONER

## 2022-04-26 RX ORDER — PANTOPRAZOLE SODIUM 40 MG/1
40 TABLET, DELAYED RELEASE ORAL DAILY
Qty: 90 TABLET | Refills: 1 | Status: SHIPPED | OUTPATIENT
Start: 2022-04-26 | End: 2022-06-15 | Stop reason: SDUPTHER

## 2022-04-26 RX ORDER — LISINOPRIL AND HYDROCHLOROTHIAZIDE 25; 20 MG/1; MG/1
1 TABLET ORAL DAILY
Qty: 90 TABLET | Refills: 1 | Status: SHIPPED | OUTPATIENT
Start: 2022-04-26 | End: 2022-12-28

## 2022-04-26 RX ORDER — ERGOCALCIFEROL 1.25 MG/1
50000 CAPSULE ORAL WEEKLY
Qty: 12 CAPSULE | Refills: 1 | Status: SHIPPED | OUTPATIENT
Start: 2022-04-26

## 2022-04-26 NOTE — PROGRESS NOTES
Chief Complaint  Hypertension  You have chosen to receive care through a telehealth visit.  Do you consent to use a video/audio connection for your medical care today? Yes via Doximity   Subjective          Keerthi Nova presents to [facility]  History of Present Illness    Hypertension: Patient here for follow-up of essential hypertension. Blood pressure is well controlled at home.She is exercising and is adherent to low salt diet. Home monitoring: The patient is not checking blood pressure at home. Symptoms: none. Medications: The patient is adherent with their medication regimen. Medication(s): ACE Inhibitor, Beta Blocker and Diuretic. The patient is due for Serum creatinine.    GERD: Patient reports doing fairly well. Patient continues to have intermittent left lower quadrant abdominal pain. Current treatment includes pantoprazole. Patient is adherent to treatment regimen.    Objective   Vital Signs:   There were no vitals taken for this visit.    Physical Exam  Constitutional:       Appearance: Normal appearance.   Neurological:      Mental Status: She is alert.   Psychiatric:         Mood and Affect: Mood normal.        Result Review :   The following data was reviewed by: PRIMITIVO Dozier on 04/26/2022:  CMP    CMP 11/19/21   Glucose CANCELED   BUN 13   Creatinine 0.73   eGFR Non  Am 102   eGFR African Am 117   Sodium 139   Potassium CANCELED   Chloride 102   Calcium 9.5   Total Protein 6.9   Albumin 4.1   Globulin 2.8   Total Bilirubin 0.2   Alkaline Phosphatase 69   AST (SGOT) 15   ALT (SGPT) 10      Comments are available for some flowsheets but are not being displayed.           CBC w/diff    CBC w/Diff 11/19/21   WBC 6.0   RBC 5.05   Hemoglobin 11.1   Hematocrit 37.6   MCV 75 (A)   MCH 22.0 (A)   MCHC 29.5 (A)   RDW 14.4   Platelets 390   Neutrophil Rel % 64   Lymphocyte Rel % 25   Monocyte Rel % 9   Eosinophil Rel % 1   Basophil Rel % 1   (A) Abnormal value             Lipid Panel    Lipid Panel 11/19/21   Total Cholesterol 192   Triglycerides 59   HDL Cholesterol 61   VLDL Cholesterol 11   LDL Cholesterol  120 (A)   (A) Abnormal value                   Assessment and Plan    Diagnoses and all orders for this visit:    1. Essential hypertension (Primary)  -     lisinopril-hydrochlorothiazide (PRINZIDE,ZESTORETIC) 20-25 MG per tablet; Take 1 tablet by mouth Daily.  Dispense: 90 tablet; Refill: 1  -     metoprolol tartrate (LOPRESSOR) 25 MG tablet; Take 1 tablet by mouth Daily.  Dispense: 90 tablet; Refill: 1    2. Left lower quadrant abdominal pain  -     Ambulatory Referral to Gastroenterology    3. Gastroesophageal reflux disease, unspecified whether esophagitis present  -     pantoprazole (PROTONIX) 40 MG EC tablet; Take 1 tablet by mouth Daily.  Dispense: 90 tablet; Refill: 1  -     Ambulatory Referral to Gastroenterology    4. Vitamin D deficiency  -     vitamin D (ERGOCALCIFEROL) 1.25 MG (31437 UT) capsule capsule; Take 1 capsule by mouth 1 (One) Time Per Week.  Dispense: 12 capsule; Refill: 1      BMI has not been calculated during today's encounter.        I spent 20 minutes caring for Keerthi on this date of service. This time includes time spent by me in the following activities:reviewing tests, performing a medically appropriate examination and/or evaluation , counseling and educating the patient/family/caregiver, ordering medications, tests, or procedures and documenting information in the medical record  Follow Up   Return in about 14 weeks (around 8/2/2022) for Recheck, Annual physical.  Patient was given instructions and counseling regarding her condition or for health maintenance advice. Please see specific information pulled into the AVS if appropriate.

## 2022-06-15 DIAGNOSIS — K21.9 GASTROESOPHAGEAL REFLUX DISEASE, UNSPECIFIED WHETHER ESOPHAGITIS PRESENT: ICD-10-CM

## 2022-06-15 RX ORDER — PANTOPRAZOLE SODIUM 40 MG/1
40 TABLET, DELAYED RELEASE ORAL DAILY
Qty: 90 TABLET | Refills: 1 | Status: SHIPPED | OUTPATIENT
Start: 2022-06-15

## 2022-06-15 NOTE — TELEPHONE ENCOUNTER
Caller: Keerthi Nova    Relationship: Self    Best call back number: [phone]    Requested Prescriptions:   Requested Prescriptions     Pending Prescriptions Disp Refills   • pantoprazole (PROTONIX) 40 MG EC tablet 90 tablet 1     Sig: Take 1 tablet by mouth Daily.        Pharmacy where request should be sent: [facility]/PHARMACY #[id number] - [place], [place] - [address] [place] [address] RD. AT [facility] - [phone] [facility] [phone]      Additional details provided by patient:PATIENT IS OUT.  SHE NEEDS A 90 DAY SUPPLY FOR INSURANCE TO COVER IT    Does the patient have less than a 3 day supply:  [x] Yes  [] No    Amarjit Reddy Rep   06/15/22 09:09 EDT

## 2022-10-13 ENCOUNTER — APPOINTMENT (OUTPATIENT)
Dept: WOMENS IMAGING | Facility: HOSPITAL | Age: 42
End: 2022-10-13

## 2022-10-13 PROCEDURE — 77067 SCR MAMMO BI INCL CAD: CPT | Performed by: RADIOLOGY

## 2022-10-13 PROCEDURE — 77063 BREAST TOMOSYNTHESIS BI: CPT | Performed by: RADIOLOGY

## 2022-10-27 DIAGNOSIS — I10 ESSENTIAL HYPERTENSION: ICD-10-CM

## 2022-10-28 RX ORDER — LISINOPRIL AND HYDROCHLOROTHIAZIDE 25; 20 MG/1; MG/1
TABLET ORAL
Qty: 90 TABLET | Refills: 1 | OUTPATIENT
Start: 2022-10-28

## 2022-12-27 DIAGNOSIS — I10 ESSENTIAL HYPERTENSION: ICD-10-CM

## 2022-12-28 RX ORDER — LISINOPRIL AND HYDROCHLOROTHIAZIDE 25; 20 MG/1; MG/1
TABLET ORAL
Qty: 30 TABLET | Refills: 0 | Status: SHIPPED | OUTPATIENT
Start: 2022-12-28

## 2022-12-28 NOTE — TELEPHONE ENCOUNTER
Rx Refill Note  Requested Prescriptions     Pending Prescriptions Disp Refills   • metoprolol tartrate (LOPRESSOR) 25 MG tablet [Pharmacy Med Name: METOPROLOL TARTRATE 25 MG TAB] 90 tablet 1     Sig: TAKE 1 TABLET BY MOUTH EVERY DAY   • lisinopril-hydrochlorothiazide (PRINZIDE,ZESTORETIC) 20-25 MG per tablet [Pharmacy Med Name: LISINOPRIL-HCTZ 20-25 MG TAB] 90 tablet 1     Sig: TAKE 1 TABLET BY MOUTH EVERY DAY      Last office visit with prescribing clinician: 11/19/2021   Last telemedicine visit with prescribing clinician: Visit date not found   Next office visit with prescribing clinician: Visit date not found                         Would you like a call back once the refill request has been completed: [] Yes [] No    If the office needs to give you a call back, can they leave a voicemail: [] Yes [] No    Misty Washburn MA  12/28/22, 08:57 EST

## 2023-03-31 DIAGNOSIS — I10 ESSENTIAL HYPERTENSION: ICD-10-CM

## 2025-01-03 NOTE — PATIENT INSTRUCTIONS
PRIOR AUTHORIZATION DENIED    Medication: BUDESONIDE 1 MG/2ML IN SUSP    Insurance Company: [employer] - Phone [phone] Fax [phone]    Denial Date: 1/3/2025    Denial Reason(s): Denied under Medicare Part D but per letter is approved under Medicare Part B.         Schedule the HIDA    Stop the bentyl.  Start the hyoscyamine instead    Continue the pantoprazole    Will get Dr Joiner' records and see what scopes are due    For any additional questions, concerns or changes to your condition after today's office visit please contact the office at [phone].

## (undated) DEVICE — FRCP BX RADJAW4 NDL 2.8 240CM LG OG BX40

## (undated) DEVICE — Device: Brand: DEFENDO AIR/WATER/SUCTION AND BIOPSY VALVE

## (undated) DEVICE — TUBING, SUCTION, 1/4" X 10', STRAIGHT: Brand: MEDLINE

## (undated) DEVICE — BITEBLOCK OMNI BLOC

## (undated) DEVICE — CANN NASL CO2 TRULINK W/O2 A/

## (undated) DEVICE — SENSR O2 OXIMAX FNGR A/ 18IN NONSTR